# Patient Record
Sex: MALE | Race: WHITE | HISPANIC OR LATINO | URBAN - METROPOLITAN AREA
[De-identification: names, ages, dates, MRNs, and addresses within clinical notes are randomized per-mention and may not be internally consistent; named-entity substitution may affect disease eponyms.]

---
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
## 2020-11-11 ENCOUNTER — TRANSCRIPTION ENCOUNTER (OUTPATIENT)
Age: 20
End: 2020-11-11

## 2021-02-02 ENCOUNTER — EMERGENCY (EMERGENCY)
Facility: HOSPITAL | Age: 21
LOS: 0 days | Discharge: HOME | End: 2021-02-02
Attending: PEDIATRICS | Admitting: PEDIATRICS
Payer: MEDICAID

## 2021-02-02 VITALS
RESPIRATION RATE: 16 BRPM | OXYGEN SATURATION: 97 % | DIASTOLIC BLOOD PRESSURE: 92 MMHG | SYSTOLIC BLOOD PRESSURE: 142 MMHG | TEMPERATURE: 98 F | HEART RATE: 103 BPM

## 2021-02-02 DIAGNOSIS — S69.90XA UNSPECIFIED INJURY OF UNSPECIFIED WRIST, HAND AND FINGER(S), INITIAL ENCOUNTER: ICD-10-CM
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
## 2021-02-02 DIAGNOSIS — S62.304A UNSPECIFIED FRACTURE OF FOURTH METACARPAL BONE, RIGHT HAND, INITIAL ENCOUNTER FOR CLOSED FRACTURE: ICD-10-CM
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
## 2021-02-02 PROCEDURE — 73110 X-RAY EXAM OF WRIST: CPT | Mod: 26,RT

## 2021-02-02 PROCEDURE — 99284 EMERGENCY DEPT VISIT MOD MDM: CPT | Mod: 25

## 2021-02-02 PROCEDURE — 73130 X-RAY EXAM OF HAND: CPT | Mod: 26,RT

## 2021-02-02 PROCEDURE — 29125 APPL SHORT ARM SPLINT STATIC: CPT

## 2021-02-02 NOTE — ED PROVIDER NOTE - NSFOLLOWUPCLINICS_GEN_ALL_ED_FT
[facility]  Hand  [address], [address]  [place] [address]  Phone: [phone]  Fax:   Follow Up Time: 1-3 Days

## 2021-02-02 NOTE — ED PROVIDER NOTE - CARE PROVIDER_API CALL
Valeriano Zavaleta)  Orthopaedic Surgery  [address] [place] [address]  Phone: [phone]  Fax: [phone]  Follow Up Time: 1-3 Days

## 2021-02-02 NOTE — ED PROVIDER NOTE - OBJECTIVE STATEMENT
The patient is a 20 year old male with no PMH presenting for evaluation s/p hand injury. Pt states he was walking up the stairs outside and tripped and fell on right hand. Pt c/o moderate pain to right hand worse over the 3-5th digits. Pain worse with flexion/extension. No numbness, tingling.

## 2021-02-02 NOTE — ED PROVIDER NOTE - NSFOLLOWUPINSTRUCTIONS_ED_ALL_ED_FT
Boxer Fracture    WHAT YOU NEED TO KNOW:    A boxer fracture is a break of a bone in your hand. This type of fracture usually happens in the bone that connects your wrist to your little finger. It can also happen in bone that connects your wrist to your ring finger. A boxer fracture occurs when you hit an object with a closed fist. The bone may be out of place or in pieces. An open fracture is when there is a break in the skin.    DISCHARGE INSTRUCTIONS:    Medicines:     Medicines may be given to decrease pain. Ask your healthcare provider how to take prescription pain medicine safely. If you have an open wound, you may also be given antibiotics or a tetanus vaccine to prevent an infection.      Take your medicine as directed. Contact your healthcare provider if you think your medicine is not helping or if you have side effects. Tell him or her if you are allergic to any medicine. Keep a list of the medicines, vitamins, and herbs you take. Include the amounts, and when and why you take them. Bring the list or the pill bottles to follow-up visits. Carry your medicine list with you in case of an emergency.    Follow up with your healthcare provider or orthopedist as directed: You may need to return for more x-rays to check bone position. Write down your questions so you remember to ask them during your visits.     Apply ice: Apply ice on your injury for 15 to 20 minutes every hour or as directed. Use an ice pack, or put crushed ice in a plastic bag. Cover it with a towel. Ice helps prevent tissue damage and decreases swelling and pain.    Elevate your hand: Elevate your hand above the level of your heart as often as you can. This will help decrease swelling and pain. Prop your hand on pillows or blankets to keep it elevated comfortably.     Go to physical therapy: A physical therapist teaches you exercises to help improve movement and strength, and to decrease pain.    Wound care: Care for your wound as directed. Carefully wash the wound with soap and water. Dry the area and put on new, clean bandages as directed. Change your bandages when they get wet or dirty.    Contact your healthcare provider or orthopedist if:     You have a fever.      Your open wound is red, swollen, or draining pus.      You have trouble moving your finger.      You have questions or concerns about your condition or care.    Return to the emergency department if:     You cannot bend or extend your finger.      You have severe pain.      You have numbness or tingling in your finger.         © Copyright [employer] 2019 All illustrations and images included in CareNotes are the copyrighted property of [employer] or [employer].

## 2021-02-02 NOTE — ED PROVIDER NOTE - PHYSICAL EXAMINATION
CONSTITUTIONAL: Well-developed; well-nourished; in no acute distress.   SKIN: warm, dry  HEAD: Normocephalic; no skull indentations, no contusions or lacerations  EXT: no gross extremity injury +b/l radial pulses 2+, +ttp of right 3-5 digits worse with flexion/extension +ttp at base of 5th metacarpa.   NEURO: gcs 15, moving all extremities grossly, following commands  PSYCH: Cooperative, appropriate

## 2021-02-02 NOTE — ED PROVIDER NOTE - NS ED ROS FT
MS: No muscle weakness, +right hand pain.   Neuro: No headache or weakness. No LOC. no numbness/tingling.   Skin: No skin rash.

## 2021-02-02 NOTE — ED PROVIDER NOTE - PATIENT PORTAL LINK FT
You can access the FollowMyHealth Patient Portal offered by [facility] by registering at the following website: [email or web address]. By joining [email or web address]’s FollowMyHealth portal, you will also be able to view your health information using other applications (apps) compatible with our system.

## 2021-04-30 ENCOUNTER — OUTPATIENT (OUTPATIENT)
Dept: OUTPATIENT SERVICES | Facility: HOSPITAL | Age: 21
LOS: 1 days | Discharge: HOME | End: 2021-04-30

## 2021-10-27 ENCOUNTER — EMERGENCY (EMERGENCY)
Facility: HOSPITAL | Age: 21
LOS: 0 days | Discharge: HOME | End: 2021-10-27
Attending: EMERGENCY MEDICINE | Admitting: EMERGENCY MEDICINE
Payer: MEDICAID

## 2021-10-27 VITALS
DIASTOLIC BLOOD PRESSURE: 74 MMHG | WEIGHT: 152.12 LBS | TEMPERATURE: 98 F | HEART RATE: 88 BPM | OXYGEN SATURATION: 97 % | SYSTOLIC BLOOD PRESSURE: 121 MMHG | RESPIRATION RATE: 18 BRPM

## 2021-10-27 DIAGNOSIS — R51.9 HEADACHE, UNSPECIFIED: ICD-10-CM

## 2021-10-27 DIAGNOSIS — F17.200 NICOTINE DEPENDENCE, UNSPECIFIED, UNCOMPLICATED: ICD-10-CM

## 2021-10-27 DIAGNOSIS — M79.10 MYALGIA, UNSPECIFIED SITE: ICD-10-CM

## 2021-10-27 PROCEDURE — 99283 EMERGENCY DEPT VISIT LOW MDM: CPT

## 2021-10-27 RX ORDER — ACETAMINOPHEN 500 MG
975 TABLET ORAL ONCE
Refills: 0 | Status: COMPLETED | OUTPATIENT
Start: 2021-10-27 | End: 2021-10-27

## 2021-10-27 RX ADMIN — Medication 975 MILLIGRAM(S): at 10:53

## 2021-10-27 NOTE — ED PROVIDER NOTE - OBJECTIVE STATEMENT
20 y/o male no PMHx presents to the ED c/o "I got my 2nd covid vaccine 3 days ago and I have a left sided headache. I haven't tried anything for pain and it is mild today." no hx trauma/ n/v/dizziness/ weakness/ numbness

## 2021-10-27 NOTE — ED PROVIDER NOTE - WET READ LAUNCH FT
From: Lele Dunham  To: Karthikeyan Ferrera MD  Sent: 10/8/2019 11:25 AM CDT  Subject: Non-Urgent Medical Question    Good Morning Dr Ferrera,  Just wanted inform you, that I started taking my blood pressure when I get up in the morning on 10/4 it was 117/73 heart rate 61 and on 10/6 it was 118/74. I got up this morning around 5:53 it read 134/82 I wanted to check it again because I could feel it was still going up. The second time it registered at 175/152 Heart rate was 65. When [facility] opened I contacted them to see if they could test my blood pressure equipment which they did around 8:00.  When tested my equipment resistered 149/86 HR 71. When they retook it the measurement was off a copy of points. I know I have about a week to go but I wanted to inform you on what had occurred.    Lele COLLADO   There are no Wet Read(s) to document.

## 2021-10-27 NOTE — ED PROVIDER NOTE - PATIENT PORTAL LINK FT
You can access the FollowMyHealth Patient Portal offered by [facility] by registering at the following website: [email or web address]. By joining [employer]’s FollowMyHealth portal, you will also be able to view your health information using other applications (apps) compatible with our system.

## 2021-10-27 NOTE — ED PROVIDER NOTE - NSFOLLOWUPCLINICS_GEN_ALL_ED_FT
[facility]  Medicine  [address] [place], [place]   Phone: [phone]  Fax:

## 2021-10-27 NOTE — ED ADULT NURSE NOTE - CHIEF COMPLAINT QUOTE
Patient c/o headache states he got second dose of vaccine two days ago patient did not take any medications and states headache is starting to feel better

## 2021-10-27 NOTE — ED PROVIDER NOTE - CLINICAL SUMMARY MEDICAL DECISION MAKING FREE TEXT BOX
20yo M no significant past medical history presenting with L sided headache post covid vaccine, now improved. +myalgias. no other acute complaints. Well appearing, NAD, non toxic. NCAT PERRLA EOMI neck supple non tender normal wob neuro non focal. Comfortable with discharge and follow-up outpatient, strict return precautions given. Endorses understanding of all of this and aware that they can return at any time for new or concerning symptoms. No further questions or concerns at this time

## 2021-10-27 NOTE — ED ADULT TRIAGE NOTE - CHIEF COMPLAINT QUOTE
TRANSFER - OUT REPORT:    Verbal report given to [facility] GENERAL JOSEFINA HAYES RN(name) on Reshma Rich  being transferred to 627(unit) for routine progression of care       Report consisted of patients Situation, Background, Assessment and   Recommendations(SBAR). Information from the following report(s) SBAR, ED Summary, Procedure Summary, Intake/Output, MAR and Recent Results was reviewed with the receiving nurse. Lines:   Peripheral IV 07/05/21 Right Antecubital (Active)   Site Assessment Clean, dry, & intact 07/05/21 0343   Phlebitis Assessment 0 07/05/21 0343   Infiltration Assessment 0 07/05/21 0343   Dressing Status Clean, dry, & intact 07/05/21 0343   Dressing Type Transparent 07/05/21 0343   Hub Color/Line Status Pink 07/05/21 0343        Opportunity for questions and clarification was provided.       Patient transported with:  dionna Patient c/o headache states he got second dose of vaccine two days ago patient did not take any medications and states headache is starting to feel better

## 2021-10-27 NOTE — ED ADULT NURSE NOTE - OBJECTIVE STATEMENT
Patient is a 21 year old male complaining of a headache for the past 1 day. Did not take anything for it at home.

## 2021-11-21 ENCOUNTER — EMERGENCY (EMERGENCY)
Facility: HOSPITAL | Age: 21
LOS: 0 days | Discharge: HOME | End: 2021-11-22
Attending: EMERGENCY MEDICINE | Admitting: EMERGENCY MEDICINE
Payer: MEDICAID

## 2021-11-21 VITALS
OXYGEN SATURATION: 97 % | TEMPERATURE: 98 F | SYSTOLIC BLOOD PRESSURE: 131 MMHG | WEIGHT: 149.91 LBS | HEART RATE: 87 BPM | DIASTOLIC BLOOD PRESSURE: 95 MMHG | RESPIRATION RATE: 19 BRPM

## 2021-11-21 DIAGNOSIS — F39 UNSPECIFIED MOOD [AFFECTIVE] DISORDER: ICD-10-CM

## 2021-11-21 DIAGNOSIS — F32.A DEPRESSION, UNSPECIFIED: ICD-10-CM

## 2021-11-21 DIAGNOSIS — F12.20 CANNABIS DEPENDENCE, UNCOMPLICATED: ICD-10-CM

## 2021-11-21 DIAGNOSIS — F41.8 OTHER SPECIFIED ANXIETY DISORDERS: ICD-10-CM

## 2021-11-21 DIAGNOSIS — F41.9 ANXIETY DISORDER, UNSPECIFIED: ICD-10-CM

## 2021-11-21 LAB
ALBUMIN SERPL ELPH-MCNC: 5.1 G/DL — SIGNIFICANT CHANGE UP (ref 3.5–5.2)
ALP SERPL-CCNC: 74 U/L — SIGNIFICANT CHANGE UP (ref 30–115)
ALT FLD-CCNC: 10 U/L — SIGNIFICANT CHANGE UP (ref 0–41)
ANION GAP SERPL CALC-SCNC: 13 MMOL/L — SIGNIFICANT CHANGE UP (ref 7–14)
APAP SERPL-MCNC: <5 UG/ML — LOW (ref 10–30)
AST SERPL-CCNC: 19 U/L — SIGNIFICANT CHANGE UP (ref 0–41)
BASOPHILS # BLD AUTO: 0.1 K/UL — SIGNIFICANT CHANGE UP (ref 0–0.2)
BASOPHILS NFR BLD AUTO: 1.6 % — HIGH (ref 0–1)
BILIRUB SERPL-MCNC: 0.5 MG/DL — SIGNIFICANT CHANGE UP (ref 0.2–1.2)
BUN SERPL-MCNC: 10 MG/DL — SIGNIFICANT CHANGE UP (ref 10–20)
CALCIUM SERPL-MCNC: 9.6 MG/DL — SIGNIFICANT CHANGE UP (ref 8.5–10.1)
CHLORIDE SERPL-SCNC: 101 MMOL/L — SIGNIFICANT CHANGE UP (ref 98–110)
CO2 SERPL-SCNC: 27 MMOL/L — SIGNIFICANT CHANGE UP (ref 17–32)
CREAT SERPL-MCNC: 0.9 MG/DL — SIGNIFICANT CHANGE UP (ref 0.7–1.5)
EOSINOPHIL # BLD AUTO: 0.06 K/UL — SIGNIFICANT CHANGE UP (ref 0–0.7)
EOSINOPHIL NFR BLD AUTO: 1 % — SIGNIFICANT CHANGE UP (ref 0–8)
ETHANOL SERPL-MCNC: <10 MG/DL — SIGNIFICANT CHANGE UP
GLUCOSE SERPL-MCNC: 85 MG/DL — SIGNIFICANT CHANGE UP (ref 70–99)
HCT VFR BLD CALC: 47 % — SIGNIFICANT CHANGE UP (ref 42–52)
HGB BLD-MCNC: 15.9 G/DL — SIGNIFICANT CHANGE UP (ref 14–18)
IMM GRANULOCYTES NFR BLD AUTO: 0.3 % — SIGNIFICANT CHANGE UP (ref 0.1–0.3)
LYMPHOCYTES # BLD AUTO: 1.58 K/UL — SIGNIFICANT CHANGE UP (ref 1.2–3.4)
LYMPHOCYTES # BLD AUTO: 25.8 % — SIGNIFICANT CHANGE UP (ref 20.5–51.1)
MCHC RBC-ENTMCNC: 29.6 PG — SIGNIFICANT CHANGE UP (ref 27–31)
MCHC RBC-ENTMCNC: 33.8 G/DL — SIGNIFICANT CHANGE UP (ref 32–37)
MCV RBC AUTO: 87.5 FL — SIGNIFICANT CHANGE UP (ref 80–94)
MONOCYTES # BLD AUTO: 0.59 K/UL — SIGNIFICANT CHANGE UP (ref 0.1–0.6)
MONOCYTES NFR BLD AUTO: 9.6 % — HIGH (ref 1.7–9.3)
NEUTROPHILS # BLD AUTO: 3.77 K/UL — SIGNIFICANT CHANGE UP (ref 1.4–6.5)
NEUTROPHILS NFR BLD AUTO: 61.7 % — SIGNIFICANT CHANGE UP (ref 42.2–75.2)
NRBC # BLD: 0 /100 WBCS — SIGNIFICANT CHANGE UP (ref 0–0)
PLATELET # BLD AUTO: 331 K/UL — SIGNIFICANT CHANGE UP (ref 130–400)
POTASSIUM SERPL-MCNC: 4.3 MMOL/L — SIGNIFICANT CHANGE UP (ref 3.5–5)
POTASSIUM SERPL-SCNC: 4.3 MMOL/L — SIGNIFICANT CHANGE UP (ref 3.5–5)
PROT SERPL-MCNC: 7.7 G/DL — SIGNIFICANT CHANGE UP (ref 6–8)
RBC # BLD: 5.37 M/UL — SIGNIFICANT CHANGE UP (ref 4.7–6.1)
RBC # FLD: 12.4 % — SIGNIFICANT CHANGE UP (ref 11.5–14.5)
SALICYLATES SERPL-MCNC: <0.3 MG/DL — LOW (ref 4–30)
SODIUM SERPL-SCNC: 141 MMOL/L — SIGNIFICANT CHANGE UP (ref 135–146)
WBC # BLD: 6.12 K/UL — SIGNIFICANT CHANGE UP (ref 4.8–10.8)
WBC # FLD AUTO: 6.12 K/UL — SIGNIFICANT CHANGE UP (ref 4.8–10.8)

## 2021-11-21 PROCEDURE — 90792 PSYCH DIAG EVAL W/MED SRVCS: CPT | Mod: 95

## 2021-11-21 PROCEDURE — 99284 EMERGENCY DEPT VISIT MOD MDM: CPT

## 2021-11-21 RX ORDER — OLANZAPINE 15 MG/1
5 TABLET, FILM COATED ORAL ONCE
Refills: 0 | Status: COMPLETED | OUTPATIENT
Start: 2021-11-21 | End: 2021-11-21

## 2021-11-21 RX ADMIN — OLANZAPINE 5 MILLIGRAM(S): 15 TABLET, FILM COATED ORAL at 23:25

## 2021-11-21 NOTE — ED BEHAVIORAL HEALTH ASSESSMENT NOTE - REFERRAL / APPOINTMENT DETAILS
Email sent on patient's behalf for [facility] outpatient psychiatric referral. [facility] crisis clinic information also faxed.

## 2021-11-21 NOTE — ED BEHAVIORAL HEALTH ASSESSMENT NOTE - OTHER
Records Checked-No Data: Bode Inpatient, Bode CL, Alpha ED, Alpha Inpatient, Alpha CL, HIE Outpatient , CVM Inpatient, CVM Outpatient, Tier Inpatient, Tier E&A, Meditech Inpatient, Meditech ED, Quick Docs, One Content Inpatient, One Content CL, [place], Social Media (For example - Facebook, [employer], [employer]), Web search, Forensic Databases

## 2021-11-21 NOTE — ED BEHAVIORAL HEALTH ASSESSMENT NOTE - NSBHPSYCHOLCOGORIENT_PSY_A_CORE
You can access the FollowMyHealth Patient Portal offered by [facility] by registering at the following website: [email or web address]. By joining [employer]’s FollowMyHealth portal, you will also be able to view your health information using other applications (apps) compatible with our system.
Oriented to time, place, person, situation

## 2021-11-21 NOTE — ED PROVIDER NOTE - CLINICAL SUMMARY MEDICAL DECISION MAKING FREE TEXT BOX
Pt medically cleared for tele psych eval.  They rec to start Zyprexa 5 mg qhs x 2 weeks.  They will e mail outpt department to try and expedite appt for pt.  Pt given dose of meds here.  Tolerated well and want sto go home.  Pt will have low threshold to return to ED. Pt instructed to return if any worsening symptoms or concerns.  They verbalize understanding.

## 2021-11-21 NOTE — ED PROVIDER NOTE - OBJECTIVE STATEMENT
22 y/o male presents to the Ed with feeling overwhelmed. patient c/o increased anxiety and mind racing over past few weeks. patient unable to sleep at night. patient denies any IVDA. patient denies any SI/HI. patient states he feels isolated at home and has no one to talk to. patient denies any fevers, chest pain, back pain. no prior psych hx ,not taking any meds, no family hx psych disorder

## 2021-11-21 NOTE — ED BEHAVIORAL HEALTH ASSESSMENT NOTE - RISK ASSESSMENT
Prominent risk and protective factors are noted in documentation above notably with some non-modifiable risk factors with future oriented thinking and insight into need to manage or address the modifiable risk factors. Low Acute Suicide Risk

## 2021-11-21 NOTE — ED PROVIDER NOTE - PATIENT PORTAL LINK FT
You can access the FollowMyHealth Patient Portal offered by [facility] by registering at the following website: [email or web address]. By joining [employer]’s FollowMyHealth portal, you will also be able to view your health information using other applications (apps) compatible with our system.

## 2021-11-21 NOTE — ED PROVIDER NOTE - NSFOLLOWUPCLINICS_GEN_ALL_ED_FT
[facility]  OP Mental Health  [address] [place] [id number]  Phone: [phone]  Fax:

## 2021-11-21 NOTE — ED PROVIDER NOTE - NS ED ROS FT
Review of Systems    Constitutional: (-) fever/ chills (-)loss of appetite or  weight loss  Eyes (-) visual changes  ENT: (-) epistaxis (-) sore throat (-) ear pain  Cardiovascular: (-) chest pain, (-) syncope (-) palpitations  Respiratory: (-) cough, (-) shortness of breath  Gastrointestinal: (-) vomiting, (-) diarrhea (-) abdominal pain  neck: (-) neck pain or stiffness  Musculoskeletal:  (-) back pain, (-) joint pain     Neurological: (-) headache, (-) altered mental status  Psychiatric: (-) hallucinations + depression

## 2021-11-21 NOTE — ED BEHAVIORAL HEALTH NOTE - BEHAVIORAL HEALTH NOTE
PRE-HOSPITAL COURSE  ===================  SOURCE:  Second-hand information via EMR documentation and primary RN.  DETAILS: Patient self-presented to the ED no additional pre-hospital course available    ===================  ED COURSE:   SOURCE:  Second-hand information via EMR documentation and primary RN   ARRIVAL:  Patient self-presented to the ED with no noted incidents.  BELONGINGS:  Clothing; nothing of note in belongings.   BEHAVIOR: Complied with triage protocols –provided blood/urine, changed into a hospital gown, allowed staff to wand/collect belongings without incident. Patient denies SI and denies HI. Patient presented to the ED with complaints of increased depression and anxiety. He has been calm and cooperative in the ED. No aggression or behavioral issues reported.   TREATMENT: No prn medications, restraints, security interventions or manual holds required.   VISITORS: Patient is unaccompanied by family or social supports.     Patient did not consent to collateral       COVID Exposure Screen- collateral (i.e. third-party, chart review, belongings, etc; include EMS and ED staff)  1.	*Has the patient had a COVID-19 test in the last 90 days?  (  ) Yes   (  X) No   (  ) Unknown- Reason: _____  2.	*Has the patient tested positive for COVID-19 antibodies? (  ) Yes   (  ) No   (X  ) Unknown- Reason: _____  3.	*Has the patient received 2 doses of the COVID-19 vaccine? (  ) Yes   ( X ) No   (  ) Unknown- Reason: _____  IF YES PROCEED TO QUESTION #6. IF NO or UNKNOWN, PLEASE SKIP TO QUESTION #7.  4.	 Date of second dose: __10/21/21______  5.	*In the past 10 days, has the patient been around anyone with a positive COVID-19 test?* (  ) Yes   ( X ) No   (  ) Unknown-   6.	*Has the patient been out of [place] within the past 10 days?* (  ) Yes   (X  ) No   (  ) Unknown- Reason: _____

## 2021-11-21 NOTE — ED BEHAVIORAL HEALTH ASSESSMENT NOTE - HPI (INCLUDE ILLNESS QUALITY, SEVERITY, DURATION, TIMING, CONTEXT, MODIFYING FACTORS, ASSOCIATED SIGNS AND SYMPTOMS)
On assessment, patient relays "today I felt really unstable" elaborating that he's had "a mix of emotions" and has "anger issues." He relays "lately I've been more and more angry" and also has been struggling with "anxiety and depression." He reports these issues longstanding "but lately I can't control it." He reports a feeling of apathy most days and insomnia. He describes delayed sleep onset (til 5am) and interrupted sleep, estimating 4-5 hours of sleep daily. He relays "I can't fall asleep if I wake up." He reports robust amounts of energy but has been more isolated, anhedonic and with appetite impairment. He denies any changes in his concentration. He denies any death wishes, SI or HI. He relays feeling like his "eyesight plays games with me" elaborating that this occurs randomly especially when he hasn't been sleeping. He reports seeing "dark figures" randomly but also reports this may be due to having his glasses on. He denies any AH, paranoid, ideas of reference, thought broadcasting or thought insertion initially. Later, however, he tells me that he "didn't want to sound crazy" but does intermittently experience vague AH, particularly if he hasn't been able to sleep for some time where he starts hearing "music or people" but can not typically recall what they say. He estimates last experiencing this about a week or 2 ago. He reports frequent worries particularly related to "things I want to do." He reports limited symptoms of hypomania, some decreased need for sleep, irritability, hyper-productivity and racing thoughts. He denies any impulsivity stating "I always think before I do anything stupid." He does not evidence any grandiosity or overt worthlessness but does convey sadness about is recent mental state and feeling his mood changes too frequently. He reports having "a lot of personal stuff that I wouldn't like to talk about" alluding to work goals, trying to move out of the house and get his own car. Patient reports trauma history but doesn't want to talk about it. He reports that he used to get nightmares but not anymore. He reports rare panic attacks but can only recall one incident ~a year ago. He reports daily cannabis use to facilitate sleep and notes this also prevents him from having dreams. He denies any nicotine, alcohol or illicit substance use but admits to remote experimentation with "acid" (last year).     COVID Exposure Screen- Patient  Have you had a COVID-19 test in the last 90 days? No  Have you tested positive for COVID-19 antibodies? No  Have you received 2 doses of the COVID-19 vaccine? Yes, received 2nd vaccine dose ~a month ago  In the past 10 days, have you been around anyone with a positive COVID-19 test? No  Have you been out of [place] within the past 10 days? No    Patient did not give permission to obtain collateral. There is inadequate rationale for overriding his objection at this time (no evident safety threats; self presented to speak with a psychiatrist on his own accord for anxiety). This is a 21 year old single, employed male, non-caregiver, domiciled with parents and siblings, previously in anger management therapy remotely, with no formal past psychiatric illness history, no history of suicide attempts or non-suicidal self injury, no violence, aggression or legal issues, reported cannabis use without other substance use, past unspecified trauma history and no pertinent past medical history who self presents to the ED endorsing feelings of depression and insomnia with anxious expressions on ED provider assessment. Psychiatry consulted for evaluation.     On assessment, patient relays "today I felt really unstable" elaborating that he's had "a mix of emotions" and has "anger issues" but "lately I've been more and more angry." He relays he has also been struggling with "anxiety and depression" reporting these issues as longstanding "but lately I can't control it." He describes his anger as a feeling of being "heated" and feeling a desperation to relax himself. He denies getting into any physical or verbal altercations as a result of his anger and spontaneously relays use of a punching bag as a primary coping mechanism for dispelling angry feelings. He reports a feeling of apathy or irritability most days and insomnia and conveys frequent mood swings. He relays feeling more down or depressed in the last week and a half. He describes delayed sleep onset most days (til 5am) and interrupted sleep, estimating 4-5 hours of sleep daily. He relays "I can't fall asleep if I wake up." He reports robust amounts of energy but has been more isolated, anhedonic and with appetite impairment estimating eating one meal a day in the last week. He denies any changes in his concentration.     Patient denies any death wishes, SI or HI. He relays feeling like his "eyesight plays games with me" elaborating that this occurs randomly especially when he hasn't been sleeping describing that he sometimes sees "dark figures" randomly but also reports this may be due to not having his glasses on. He denies paranoia, ideas of reference, thought broadcasting or thought insertion. He initially denies any AH but later tells me that he "didn't want to sound crazy" but does intermittently experience vague AH, particularly if he hasn't been able to sleep for some time where he starts hearing "music or people" but can not typically recall what they say. He denies any command hallucinations. He estimates last experiencing this about a week or 2 ago. He reports frequent worries particularly related to "things I want to do" spontaneously conveying stressors of work goals, trying to move out of the house and wanting to get his own car. He reports limited symptoms of hypomania, some decreased need for sleep, irritability, hyper-productivity and racing thoughts. He denies any impulsivity stating "I always think before I do anything stupid." He does not evidence any grandiosity or overt worthlessness but does reiterate feeling sadness about his recent mental state and feeling his mood changes too frequently.     Patient reports trauma history but doesn't want to talk about it. He reports that he used to get nightmares but not anymore. He reports rare panic attacks but can only recall one incident ~a year ago. He reports daily cannabis use to facilitate sleep and notes this also prevents him from having dreams. He denies any nicotine, alcohol or illicit substance use but admits to remote experimentation with "acid" (last year). He partakes in treatment planning appropriately noting that he is not inclined to stay in the hospital overnight for treatment but would do so in the future if symptoms worsened or safety issues arose. He conveys interest in establishing with an outpatient psychiatrist and is also interested in medication management. He is receptive to education on the diagnostic possibilities contributing to his symptoms and medication management strategies and accepts recommendation for Olanzapine (after r/b/a discussion). He reports having access to a primary care provider who he is able to contact to facilitate care and also consents to having an email sent to [facility] psychiatric clinic to reach out to him for an intake appointment.     COVID Exposure Screen- Patient  Have you had a COVID-19 test in the last 90 days? No  Have you tested positive for COVID-19 antibodies? No  Have you received 2 doses of the COVID-19 vaccine? Yes, received 2nd vaccine dose ~a month ago  In the past 10 days, have you been around anyone with a positive COVID-19 test? No  Have you been out of [place] within the past 10 days? No    Patient did not give permission to obtain collateral. He conveys wanting to maintain an element of his privacy and not wanting to concern his family with having presented to the emergency room for psychiatric complaints. Rationale for overriding his objection at this time is insufficient as there are no evident safety threats and patient self presented to speak with a psychiatrist on his own accord.

## 2021-11-21 NOTE — ED BEHAVIORAL HEALTH ASSESSMENT NOTE - MEDICATIONS (PRESCRIPTIONS, DIRECTIONS)
Offer Zyprexa 5 mg PO in the ED to assess tolerance. If no expressed adverse effect; can send patient home with Zyprexa 5 mg HS (x2 weeks).

## 2021-11-21 NOTE — ED BEHAVIORAL HEALTH ASSESSMENT NOTE - DIFFERENTIAL
Unspecified mood and psychotic disorders  r/o Major Depressive disorder w/ mixed features w/ psychotic features  vs Mixed episode Bipolar illness w/ psychotic features  vs Schizophreniform disorder (w/ depressive prodrome)  vs Cannabis induced psychosis

## 2021-11-21 NOTE — ED BEHAVIORAL HEALTH ASSESSMENT NOTE - DETAILS
alludes to trauma but doesn't want to talk about it. Asthma (sister). No known family psychiatric illness, suicides or substance use issues. self referred as above Verbal safety planning discussed at length. Patient to return to ED if hallucinations, other symptoms of psychosis, suicidality/homicidality, subjective worsening of symptoms or intolerable side effects to medications

## 2021-11-21 NOTE — ED PROVIDER NOTE - ATTENDING CONTRIBUTION TO CARE
22 yo M presents with feeling overwhelmed, feels increasing anxiety over the past few weeks. Having trouble sleeping at night, feeling isolated.  Not suicidal, no AV hallucinations.  Pt wants to speak to psychiatrist,  On exam pt in NAD AAO x 3, no signs of trauma, well groomed, cooperative, Lungs cta b/l, abd soft nt nd, steady gait

## 2021-11-21 NOTE — ED ADULT NURSE NOTE - DISCHARGE DATE/TIME
Your cholesterol panel is slight improved, may need to increase your atorvastatin if stays elevated, you are not anemic, your urine and kidney and liver and thyroid and diabetes and vitamin d level are controlled 22-Nov-2021 00:07

## 2021-11-21 NOTE — ED BEHAVIORAL HEALTH ASSESSMENT NOTE - NSSUICRSKFACTOR_PSY_ALL_CORE
Current and Past Psychiatric Diagnoses/Presenting Symptoms/Historical Factors/Treatment Related Factors/Activating Events/Stressors/Other

## 2021-11-21 NOTE — ED BEHAVIORAL HEALTH ASSESSMENT NOTE - SUMMARY
This is a 21 year old single, employed male, non-caregiver, domiciled with parents and siblings, previously in anger management therapy remotely, with no formal past psychiatric illness history, no history of suicide attempts or non-suicidal self injury, no violence, aggression or legal issues, reported cannabis use without other substance use, past unspecified trauma history and no pertinent past medical history who self presents to the ED endorsing feelings of depression and insomnia with anxious expressions on ED provider assessment. On psychiatric evaluation, he is appropriately engaged, not acutely manic, psychotic, suicidal or homicidal and does not meet criteria for involuntary hospitalization. He conveys a myriad of mood and anxiety spectrum symptoms with vague auditory hallucinations when insomnia is exacerbated with increased feelings of depression in the last week and half. Differential is broad particularly given patient's age and cannabis use but clinical picture would be most consistent with prodrome preceding Schizophrenia. He is not interested in voluntary psychiatric admission but does engage well towards outpatient treatment planning. After r/b/a discussion, recommend initiating atypical neuroleptic in the ED to minimize risk of deteriorating mood and psychosis as patient awaits outpatient care.

## 2021-11-22 VITALS
SYSTOLIC BLOOD PRESSURE: 126 MMHG | DIASTOLIC BLOOD PRESSURE: 77 MMHG | RESPIRATION RATE: 18 BRPM | OXYGEN SATURATION: 98 % | HEART RATE: 81 BPM

## 2021-11-22 PROBLEM — Z78.9 OTHER SPECIFIED HEALTH STATUS: Chronic | Status: ACTIVE | Noted: 2021-10-27

## 2021-11-22 RX ORDER — OLANZAPINE 15 MG/1
1 TABLET, FILM COATED ORAL
Qty: 14 | Refills: 0
Start: 2021-11-22 | End: 2021-12-05

## 2022-06-02 ENCOUNTER — EMERGENCY (EMERGENCY)
Facility: HOSPITAL | Age: 22
LOS: 0 days | Discharge: HOME | End: 2022-06-02
Attending: STUDENT IN AN ORGANIZED HEALTH CARE EDUCATION/TRAINING PROGRAM | Admitting: STUDENT IN AN ORGANIZED HEALTH CARE EDUCATION/TRAINING PROGRAM
Payer: MEDICAID

## 2022-06-02 VITALS
SYSTOLIC BLOOD PRESSURE: 112 MMHG | OXYGEN SATURATION: 99 % | HEART RATE: 74 BPM | DIASTOLIC BLOOD PRESSURE: 68 MMHG | RESPIRATION RATE: 15 BRPM | TEMPERATURE: 98 F

## 2022-06-02 VITALS
OXYGEN SATURATION: 98 % | DIASTOLIC BLOOD PRESSURE: 74 MMHG | SYSTOLIC BLOOD PRESSURE: 119 MMHG | RESPIRATION RATE: 16 BRPM | TEMPERATURE: 98 F | WEIGHT: 138.01 LBS | HEART RATE: 91 BPM

## 2022-06-02 DIAGNOSIS — F19.94 OTHER PSYCHOACTIVE SUBSTANCE USE, UNSPECIFIED WITH PSYCHOACTIVE SUBSTANCE-INDUCED MOOD DISORDER: ICD-10-CM

## 2022-06-02 DIAGNOSIS — F12.90 CANNABIS USE, UNSPECIFIED, UNCOMPLICATED: ICD-10-CM

## 2022-06-02 PROCEDURE — 99284 EMERGENCY DEPT VISIT MOD MDM: CPT

## 2022-06-02 RX ORDER — HYDROXYZINE HCL 10 MG
1 TABLET ORAL
Qty: 7 | Refills: 0
Start: 2022-06-02 | End: 2022-06-08

## 2022-06-02 RX ORDER — HYDROXYZINE HCL 10 MG
50 TABLET ORAL ONCE
Refills: 0 | Status: COMPLETED | OUTPATIENT
Start: 2022-06-02 | End: 2022-06-02

## 2022-06-02 RX ADMIN — Medication 50 MILLIGRAM(S): at 22:24

## 2022-06-02 NOTE — ED BEHAVIORAL HEALTH ASSESSMENT NOTE - DESCRIPTION
None known Patient calm and cooperative in ED. Patient works at [employer], lives with family

## 2022-06-02 NOTE — ED PROVIDER NOTE - NS ED ROS FT
CONST: No fever, chills or bodyaches  EYES: No pain, redness, drainage or visual changes.  ENT: No ear pain or discharge, nasal discharge or congestion. No sore throat  CARD: No chest pain, palpitations  RESP: No SOB, cough, hemoptysis. No hx of asthma or COPD  GI: No abdominal pain, N/V/D  : No urinary symptoms  MS: No joint pain, back pain or extremity pain/injury  SKIN: No rashes  NEURO: No headache, dizziness, paresthesias or LOC  PSYCH: (+) anger issues

## 2022-06-02 NOTE — ED PROVIDER NOTE - NSFOLLOWUPINSTRUCTIONS_ED_ALL_ED_FT
Mood Disorders    WHAT YOU NEED TO KNOW:    A mood disorder, or affective disorder, is a condition that causes your mood or emotions to be out of control. Your mood can affect your personality and how you act. It can also affect how you feel about yourself and life in general.    DISCHARGE INSTRUCTIONS:    Medicines:     Medicines can help control your moods.      Take your medicine as directed. Contact your healthcare provider if you think your medicine is not helping or if you have side effects. Tell him of her if you are allergic to any medicine. Keep a list of the medicines, vitamins, and herbs you take. Include the amounts, and when and why you take them. Bring the list or the pill bottles to follow-up visits. Carry your medicine list with you in case of an emergency.    Follow up with your healthcare provider as directed: You may need to return for regular visits or blood tests. Write down your questions so you remember to ask them during your visits.     Self-care:     Try to get 6 to 8 hours of sleep each night. Contact your healthcare provider if you have trouble sleeping.       Manage your stress. Learn new ways to relax, such as deep breathing or meditation.      Talk to someone about how you feel. Join a support group. Talk to your healthcare provider, family, or friends about your feelings. Tell them about things that upset you.       Exercise regularly. Ask about the best exercise plan for you. Most healthcare providers recommend 30 minutes each day, 5 days a week. Exercise helps to lower stress and manage your moods.    Contact your healthcare provider if:     You are depressed.      You feel anxious or worried.      You begin to drink alcohol, or you drink more than usual.      You take illegal drugs.      You take medicines that are not prescribed to you.      Your medicine causes you to feel drowsy, keeps you awake, or affects how much you eat.      You have questions or concerns about your condition or care.    Return to the emergency department if:     You have severe depression.      You want to hurt yourself or others.         © Copyright [employer] 2019 All illustrations and images included in CareNotes are the copyrighted property of [employer].D.A[employer] Inc[employer] or [employer].

## 2022-06-02 NOTE — ED PROVIDER NOTE - NS ED ATTENDING STATEMENT MOD
This was a shared visit with the FREDDIE. I reviewed and verified the documentation and independently performed the documented:

## 2022-06-02 NOTE — ED ADULT NURSE NOTE - HPI (INCLUDE ILLNESS QUALITY, SEVERITY, DURATION, TIMING, CONTEXT, MODIFYING FACTORS, ASSOCIATED SIGNS AND SYMPTOMS)
Pt. presents to ED requesting to be evaluated by psychiatry. Reports mood swings, mood outbursts, depression & decreased PO intake. States his mood will vary from happy to sad/depressed, to anger where he starts to hit objects. Denies getting physical with others and denies SI; reports SI thoughts in the past but currently denies SI. Also reports significant decrease in appetite with unintentional weight loss. States family is not supportive of mental health support & pt. requesting resources. Denies drug use other then marijuana; occasional etoh use.

## 2022-06-02 NOTE — ED ADULT TRIAGE NOTE - CHIEF COMPLAINT QUOTE
"I have anger issues I get really mad at my family a lot, I cant eat sometimes even though I know Im hungry, I have depression sometimes I cant leave my bed." Pt reports he wants to speak to a psychiatrist. Pt reports he has had thoughts of hurting himself in the past but not currently. Denies HI.

## 2022-06-02 NOTE — ED PROVIDER NOTE - PROGRESS NOTE DETAILS
FF: psych consulted, see consult note.   Plan: Hydroxyzine 50 mg PRN anxiety for 7 days  2. Referral to [facility] sent, patient provided with number as well [phone]  3. Safety plan complete  4. Advised to look up [facility] on the internet while awaiting OPD appointment  intermittent explosive disorder, JEFF, social anxiety disorder

## 2022-06-02 NOTE — ED BEHAVIORAL HEALTH ASSESSMENT NOTE - SUMMARY
Patient is a 21 year old single, employed male, non-caregiver, domiciled with parents and siblings, previously in anger management therapy remotely, with no formal past psychiatric illness history, no history of suicide attempts or intentional non-suicidal self injury, no violence or legal issues, reported cannabis use without other substance use, past unspecified trauma history and no pertinent past medical history who self presents to the ED wanting help managing his anger. He presented to ED in November 2021 with similar complaints. On approach, patient is alert, oriented, and cooperative with interview.    Patient's current presentation is consistent with substance induced mood disorder. Patient currently presents as linear, contemplative, and under good behavioral control. Though he is losing weight, he is getting adequate sleep and has good focus at work and has not faced any adverse effects. He is future oriented and has no safety concerns with outpatient followup. Patient counseled that daily cannabis use complicates diagnosis and advised to reduce/cease use.     Patient does not demonstrate manic or psychotic symptoms, is not acutely suicidal, and does not meet criteria for inpatient treatment.    PLAN  1. Hydroxyzine 50 mg PRN anxiety for 7 days  2. Referral to [facility] sent, patient provided with number as well [phone]  3. Safety plan complete  4. Advised to look up [facility] on the internet while awaiting OPD appointment

## 2022-06-02 NOTE — ED BEHAVIORAL HEALTH ASSESSMENT NOTE - RISK ASSESSMENT
Low Acute Suicide Risk Risk factors: impulsivity, gender, passive SI  Protective factors: insight into need for treatment, employment/housing/relationship security, no access to lethal means, coping mechanism, no prior suicidal attempts

## 2022-06-02 NOTE — ED PROVIDER NOTE - NSFOLLOWUPCLINICS_GEN_ALL_ED_FT
[facility]  OB/GYN  [address] [place] [address]  Phone: [phone]  Fax:   Follow Up Time: 4-6 Days

## 2022-06-02 NOTE — ED PROVIDER NOTE - PHYSICAL EXAMINATION
Physical Exam    Vital Signs: I have reviewed the initial vital signs.  Constitutional: well-nourished, appears stated age, no acute distress  Eyes: Conjunctiva pink, Sclera clear  Cardiovascular: S1 and S2, regular rate, regular rhythm, well-perfused extremities, radial pulses equal and 2+ b/l.   Respiratory: unlabored respiratory effort, clear to auscultation bilaterally no wheezing, rales and rhonchi. pt is speaking full sentences. no accessory muscle use.   Gastrointestinal: soft, non-tender, nondistended abdomen, no pulsatile mass, normal bowl sounds, no rebound, no guarding  Musculoskeletal: FROM of b/l upper and lower extremities   Integumentary: warm, dry, no rash  Neurologic: awake, alert, cranial nerves II-XII grossly intact, extremities’ motor and sensory functions grossly intact. steady gait.   Psychiatric: appropriate mood, appropriate affect. pt is kind, calm, and cooperative

## 2022-06-02 NOTE — ED PROVIDER NOTE - ATTENDING APP SHARED VISIT CONTRIBUTION OF CARE
22 y/o male with no significant PMH presents to the ED for evaluation of having a hard time dealing with his anger management and passive suicidal thoughts. denies alcohol use- occasional marijuana use. denies having a plan. denies medical complaints.    CONSTITUTIONAL: Well-developed; well-nourished; in no acute distress.   SKIN: warm, dry  HEAD: Normocephalic; atraumatic.  EYES: PERRL, EOMI, no conjunctival erythema  ENT: No nasal discharge; airway clear.  NECK: Supple; non tender.  CARD: S1, S2 normal; no murmurs, gallops, or rubs. Regular rate and rhythm.   RESP: No wheezes, rales or rhonchi.  ABD: soft ntnd  EXT: Normal ROM.  No clubbing, cyanosis or edema.   NEURO: Alert, oriented, grossly unremarkable  PSYCH: Cooperative, appropriate.    plan for psych eval.

## 2022-06-02 NOTE — ED BEHAVIORAL HEALTH ASSESSMENT NOTE - DETAILS
In chart Patient reports passive SI, denies plan or intent or history of either. Patient gets angry and punches walls during conflict with girlfriend and family, has not injured anyone or had encounters with legal system Patient does not want to discuss at this time Plan discussed with patient, questions answered

## 2022-06-02 NOTE — ED PROVIDER NOTE - CLINICAL SUMMARY MEDICAL DECISION MAKING FREE TEXT BOX
20 y/o male with no significant PMH presents to the ED for evaluation of having a hard time dealing with his anger management and passive suicidal thoughts. psych consult appreciated. discharged with hydroxyzine PRN, outpatient management. return precautions discussed in detail.

## 2022-06-02 NOTE — ED BEHAVIORAL HEALTH ASSESSMENT NOTE - ADDITIONAL DETAILS ALL
Patient has injured his hand when punching objects, however it's not clear that this is done with the goal of injuring himself.

## 2022-06-02 NOTE — ED BEHAVIORAL HEALTH ASSESSMENT NOTE - HPI (INCLUDE ILLNESS QUALITY, SEVERITY, DURATION, TIMING, CONTEXT, MODIFYING FACTORS, ASSOCIATED SIGNS AND SYMPTOMS)
Patient is a 21 year old single, employed male, non-caregiver, domiciled with parents and siblings, previously in anger management therapy remotely, with no formal past psychiatric illness history, no history of suicide attempts or non-suicidal self injury, no violence, aggression or legal issues, reported cannabis use without other substance use, past unspecified trauma history and no pertinent past medical history who self presents to the ED wanting help managing his anger. He presented to ED in November 2021 with similar complaints. On approach, patient is alert, oriented, and cooperative with interview.    Patient reports that he has been trying to get outpatient care to address his anger management issues but that he hasn't been able to. When he gets angry he punches things like walls and other objects, and has injured his hand in the process. He reports feelings of anxiety, especially in social settings which has made him isolative and not want to be around people other than his girlfriend. He also reports symptoms of depression like losing weight, and feeling down. These feelings come and go and don't occur for most of the week. He denies sustained feelings of hopelessness or trouble focusing at work--he reports that his focus on work helps him feel better. He gets 8 hours of sleep a night now that he's working, in the past would go several days without sleep during which time he would feel very fatigued and sometimes have auditory hallucinations. He denies current hallucinations. He does not drink alcohol or use drugs with the exception of cannabis which he smokes every night--he reports that this is a decrease from his prior use. Patient acknowledges that smoking is a "double edged sword" and that it's not a reliable way to deal with his feelings. He denies a history of self harm but does report intermittent thoughts about not wanting to be alive, reports that he gets tattoos whenever he gets an impulse to hurt himself. Denies any plan or intent to kill himself, reports his girlfriend as a reason to live as she has bipolar disorder and he loves her very much. He reports that he has tried to set up an appointment with outpatient psychiatry before but has not been able to. Denies access to firearms.    Per girlfriend, patient is a sensitive person with an "explosive" temper who has never hurt anyone, but has destroyed property. He has been losing weight and a couple of weeks ago told her that he wants to kill himself when his parents are out of town, however he had no plan or intent to do so. She emphasizes that he does not feel this way now.

## 2022-06-02 NOTE — ED BEHAVIORAL HEALTH ASSESSMENT NOTE - OTHER
Patient is appropriately reflective about need for treatment of anger issues, has good insight into triggers

## 2022-06-02 NOTE — ED PROVIDER NOTE - OBJECTIVE STATEMENT
22 y/o male with no significant PMH presents to the ED for evaluation of having a hard time dealing with his anger management and moods of feeling depressed. pt reports he used to see a therapist when he was 19 y/o, but after 3 sessions stopped going to one. pt reports he uses to do xanax, percocet, and acid when he was 19 y/o but has stopped, only smokes marijuana and denies use of marijuana. pt reports he gets angry often and punches things or breaks objects but does not injure himself or others. pt reports sometimes he has thoughts of hurting himself but does not have a plan. pt reports when he feels that way he goes to get a tattoo to soothe him. pt reports he has his girlfriend to talk to who suggest he seek help. pt reports he tried to get help from [employer], his job, through his benefits but has not has success. pt reports his family does not understand him and he comes from a [place] family who does not believe in taking medication for mental health. pt reports he is just trying to get a connect with a psychiatrist of therapist to help him control and handle his emotions. pt reports he came today because he did know how else to get the help he has been looking for and his girlfriend has bee encouraging him to get. pt denies current suicidal thoughts. pt denies homicidal ideations, visual or auditory hallucinations, illicit drug use, use of alcohol, fever, chills, headache, or dizziness.

## 2022-06-02 NOTE — ED ADULT NURSE NOTE - NSIMPLEMENTINTERV_GEN_ALL_ED
Implemented All Universal Safety Interventions:  [place] to call system. Call bell, personal items and telephone within reach. Instruct patient to call for assistance. Room bathroom lighting operational. Non-slip footwear when patient is off stretcher. Physically safe environment: no spills, clutter or unnecessary equipment. Stretcher in lowest position, wheels locked, appropriate side rails in place.

## 2022-06-02 NOTE — ED PROVIDER NOTE - PATIENT PORTAL LINK FT
You can access the FollowMyHealth Patient Portal offered by [facility] by registering at the following website: [email or web address]. By joining [facility]’s FollowMyHealth portal, you will also be able to view your health information using other applications (apps) compatible with our system.

## 2022-06-02 NOTE — ED BEHAVIORAL HEALTH ASSESSMENT NOTE - OTHER PAST PSYCHIATRIC HISTORY (INCLUDE DETAILS REGARDING ONSET, COURSE OF ILLNESS, INPATIENT/OUTPATIENT TREATMENT)
Seen in ED on Nov 2021, discharged with outpatient followup however patient has been unable to obtain outpatient care due to insurance issues    Has had therapy at age 18, did not find it helpful but is willing to retry

## 2022-06-13 ENCOUNTER — EMERGENCY (EMERGENCY)
Facility: HOSPITAL | Age: 22
LOS: 0 days | Discharge: HOME | End: 2022-06-13
Attending: EMERGENCY MEDICINE | Admitting: EMERGENCY MEDICINE
Payer: MEDICAID

## 2022-06-13 VITALS
DIASTOLIC BLOOD PRESSURE: 70 MMHG | OXYGEN SATURATION: 99 % | HEART RATE: 73 BPM | TEMPERATURE: 98 F | SYSTOLIC BLOOD PRESSURE: 135 MMHG | RESPIRATION RATE: 16 BRPM

## 2022-06-13 DIAGNOSIS — Z76.0 ENCOUNTER FOR ISSUE OF REPEAT PRESCRIPTION: ICD-10-CM

## 2022-06-13 DIAGNOSIS — F41.9 ANXIETY DISORDER, UNSPECIFIED: ICD-10-CM

## 2022-06-13 PROCEDURE — 99283 EMERGENCY DEPT VISIT LOW MDM: CPT

## 2022-06-13 RX ORDER — HYDROXYZINE HCL 10 MG
1 TABLET ORAL
Qty: 20 | Refills: 0
Start: 2022-06-13 | End: 2022-06-19

## 2022-06-13 RX ORDER — HYDROXYZINE HCL 10 MG
25 TABLET ORAL ONCE
Refills: 0 | Status: COMPLETED | OUTPATIENT
Start: 2022-06-13 | End: 2022-06-13

## 2022-06-13 RX ADMIN — Medication 25 MILLIGRAM(S): at 22:00

## 2022-06-13 NOTE — ED ADULT NURSE NOTE - OBJECTIVE STATEMENT
Pt c/o anxiety, states he ran out of his meds and has been trying to get an appointment with a psychiatrist but unable get an appointment at this time.

## 2022-06-13 NOTE — ED PROVIDER NOTE - NSFOLLOWUPCLINICS_GEN_ALL_ED_FT
[facility]  OP Mental Health  [address] [place] [id number]  Phone: [phone]  Fax:

## 2022-06-13 NOTE — ED PROVIDER NOTE - PATIENT PORTAL LINK FT
You can access the FollowMyHealth Patient Portal offered by [facility] by registering at the following website: [email or web address]. By joining [facility]’s FollowMyHealth portal, you will also be able to view your health information using other applications (apps) compatible with our system.

## 2022-06-13 NOTE — ED PROVIDER NOTE - NS ED ROS FT
CONSTITUTIONAL: Negatve   SKIN: Negatve   HEAD: Negatve   EYES: Negatve   ENT: Negatve   NECK: Negatve   CARD:Negatve   RESP:Negatve   ABD: Negatve   EXT: Negatve  LYMPH: Negatve   NEURO: Negatve   PSYCH: see hpi

## 2022-06-13 NOTE — ED PROVIDER NOTE - NSFOLLOWUPINSTRUCTIONS_ED_ALL_ED_FT
Call the referral number for outpatient mental health follow up.  Take medications as prescribed if needed.  Return for any worsening symptoms.

## 2022-06-22 ENCOUNTER — OUTPATIENT (OUTPATIENT)
Dept: OUTPATIENT SERVICES | Facility: HOSPITAL | Age: 22
LOS: 1 days | Discharge: HOME | End: 2022-06-22

## 2022-06-29 ENCOUNTER — OUTPATIENT (OUTPATIENT)
Dept: OUTPATIENT SERVICES | Facility: HOSPITAL | Age: 22
LOS: 1 days | Discharge: HOME | End: 2022-06-29

## 2022-06-29 ENCOUNTER — APPOINTMENT (OUTPATIENT)
Dept: PSYCHIATRY | Facility: CLINIC | Age: 22
End: 2022-06-29

## 2022-06-30 DIAGNOSIS — F41.1 GENERALIZED ANXIETY DISORDER: ICD-10-CM

## 2022-07-20 ENCOUNTER — APPOINTMENT (OUTPATIENT)
Dept: PSYCHIATRY | Facility: CLINIC | Age: 22
End: 2022-07-20

## 2022-07-20 ENCOUNTER — OUTPATIENT (OUTPATIENT)
Dept: OUTPATIENT SERVICES | Facility: HOSPITAL | Age: 22
LOS: 1 days | Discharge: HOME | End: 2022-07-20

## 2022-07-20 DIAGNOSIS — F41.1 GENERALIZED ANXIETY DISORDER: ICD-10-CM

## 2022-07-20 DIAGNOSIS — G47.00 INSOMNIA, UNSPECIFIED: ICD-10-CM

## 2022-07-20 PROCEDURE — 90792 PSYCH DIAG EVAL W/MED SRVCS: CPT

## 2022-07-20 RX ORDER — CITALOPRAM HYDROBROMIDE 10 MG/1
10 TABLET, FILM COATED ORAL
Qty: 30 | Refills: 0 | Status: DISCONTINUED | COMMUNITY
Start: 2022-01-07

## 2022-07-20 RX ORDER — HYDROXYZINE HYDROCHLORIDE 50 MG/1
50 TABLET ORAL
Qty: 7 | Refills: 0 | Status: DISCONTINUED | COMMUNITY
Start: 2022-06-02

## 2022-07-20 RX ORDER — AMOXICILLIN 500 MG/1
500 TABLET, FILM COATED ORAL
Qty: 21 | Refills: 0 | Status: DISCONTINUED | COMMUNITY
Start: 2022-06-22

## 2022-07-26 ENCOUNTER — APPOINTMENT (OUTPATIENT)
Dept: PSYCHIATRY | Facility: CLINIC | Age: 22
End: 2022-07-26

## 2022-07-26 ENCOUNTER — OUTPATIENT (OUTPATIENT)
Dept: OUTPATIENT SERVICES | Facility: HOSPITAL | Age: 22
LOS: 1 days | Discharge: HOME | End: 2022-07-26

## 2022-07-26 DIAGNOSIS — F41.1 GENERALIZED ANXIETY DISORDER: ICD-10-CM

## 2022-07-26 DIAGNOSIS — G47.00 INSOMNIA, UNSPECIFIED: ICD-10-CM

## 2022-08-12 ENCOUNTER — APPOINTMENT (OUTPATIENT)
Dept: PSYCHIATRY | Facility: CLINIC | Age: 22
End: 2022-08-12

## 2022-08-17 ENCOUNTER — OUTPATIENT (OUTPATIENT)
Dept: OUTPATIENT SERVICES | Facility: HOSPITAL | Age: 22
LOS: 1 days | Discharge: HOME | End: 2022-08-17

## 2022-08-17 ENCOUNTER — APPOINTMENT (OUTPATIENT)
Dept: PSYCHIATRY | Facility: CLINIC | Age: 22
End: 2022-08-17

## 2022-08-17 DIAGNOSIS — G47.00 INSOMNIA, UNSPECIFIED: ICD-10-CM

## 2022-08-17 DIAGNOSIS — F41.1 GENERALIZED ANXIETY DISORDER: ICD-10-CM

## 2022-08-22 VITALS — WEIGHT: 130 LBS | BODY MASS INDEX: 19.7 KG/M2 | HEIGHT: 68 IN

## 2022-08-24 ENCOUNTER — APPOINTMENT (OUTPATIENT)
Dept: PSYCHIATRY | Facility: CLINIC | Age: 22
End: 2022-08-24

## 2022-08-24 ENCOUNTER — OUTPATIENT (OUTPATIENT)
Dept: OUTPATIENT SERVICES | Facility: HOSPITAL | Age: 22
LOS: 1 days | Discharge: HOME | End: 2022-08-24

## 2022-08-24 DIAGNOSIS — F34.0 CYCLOTHYMIC DISORDER: ICD-10-CM

## 2022-08-24 DIAGNOSIS — G47.00 INSOMNIA, UNSPECIFIED: ICD-10-CM

## 2022-08-24 DIAGNOSIS — F41.1 GENERALIZED ANXIETY DISORDER: ICD-10-CM

## 2022-08-24 PROCEDURE — 99214 OFFICE O/P EST MOD 30 MIN: CPT

## 2022-09-08 ENCOUNTER — APPOINTMENT (OUTPATIENT)
Dept: PSYCHIATRY | Facility: CLINIC | Age: 22
End: 2022-09-08

## 2022-09-08 ENCOUNTER — OUTPATIENT (OUTPATIENT)
Dept: OUTPATIENT SERVICES | Facility: HOSPITAL | Age: 22
LOS: 1 days | Discharge: HOME | End: 2022-09-08

## 2022-09-08 DIAGNOSIS — F34.0 CYCLOTHYMIC DISORDER: ICD-10-CM

## 2022-09-08 DIAGNOSIS — G47.00 INSOMNIA, UNSPECIFIED: ICD-10-CM

## 2022-09-08 DIAGNOSIS — F41.1 GENERALIZED ANXIETY DISORDER: ICD-10-CM

## 2022-09-08 PROCEDURE — 99214 OFFICE O/P EST MOD 30 MIN: CPT | Mod: 95

## 2022-09-21 ENCOUNTER — OUTPATIENT (OUTPATIENT)
Dept: OUTPATIENT SERVICES | Facility: HOSPITAL | Age: 22
LOS: 1 days | Discharge: HOME | End: 2022-09-21

## 2022-09-21 ENCOUNTER — APPOINTMENT (OUTPATIENT)
Dept: PSYCHIATRY | Facility: CLINIC | Age: 22
End: 2022-09-21

## 2022-09-21 DIAGNOSIS — F41.1 GENERALIZED ANXIETY DISORDER: ICD-10-CM

## 2022-09-21 DIAGNOSIS — G47.00 INSOMNIA, UNSPECIFIED: ICD-10-CM

## 2022-09-21 DIAGNOSIS — F34.0 CYCLOTHYMIC DISORDER: ICD-10-CM

## 2022-09-21 PROCEDURE — 99214 OFFICE O/P EST MOD 30 MIN: CPT

## 2022-10-05 ENCOUNTER — APPOINTMENT (OUTPATIENT)
Dept: PSYCHIATRY | Facility: CLINIC | Age: 22
End: 2022-10-05

## 2022-10-05 ENCOUNTER — NON-APPOINTMENT (OUTPATIENT)
Age: 22
End: 2022-10-05

## 2022-10-14 ENCOUNTER — OUTPATIENT (OUTPATIENT)
Dept: OUTPATIENT SERVICES | Facility: HOSPITAL | Age: 22
LOS: 1 days | Discharge: HOME | End: 2022-10-14

## 2022-10-14 ENCOUNTER — APPOINTMENT (OUTPATIENT)
Dept: PSYCHIATRY | Facility: CLINIC | Age: 22
End: 2022-10-14

## 2022-10-14 DIAGNOSIS — F34.0 CYCLOTHYMIC DISORDER: ICD-10-CM

## 2022-10-14 DIAGNOSIS — F41.1 GENERALIZED ANXIETY DISORDER: ICD-10-CM

## 2022-10-14 DIAGNOSIS — G47.00 INSOMNIA, UNSPECIFIED: ICD-10-CM

## 2022-10-14 NOTE — PHYSICAL EXAM
[Average] : average [Cooperative] : cooperative [Depressed] : depressed [Full] : full [Clear] : clear [Linear/Goal Directed] : linear/goal directed [WNL] : within normal limits

## 2022-10-14 NOTE — PLAN
[FreeTextEntry2] : Treatment Planning: \par Goal #1 " I want to manage my emotions and to have a healthy relationship" \par \par Obj#1 Patient will express feelings and at least 2 sources of mood disturbance. \par Obj#2 Patient will learn and utilize at least 2 positive coping, conflict resolution, or CBT methods, \par \par Goal #2 " I don't want to feel calm and not over think" \par \par Obj#1 Patient will identify at least 2 trigger of anxiety \par Obj#2 Patient will learn at least 2 relaxation methods other than use of substances. \par  [[facility]] : [facility]  [Supportive Therapy] : Supportive Therapy [de-identified] : Patient reports that he had to take a leave of absence from his job since he has been having difficulty functioning and crying at work often. He reports still having other symptoms such as poor appetite, lack of motivation, wanting to stay in bed all the time, withdrawing from people, feeling empty, and not having anything to look forward to. He sometimes wishes he was not here but denies any self-harm or suicide thoughts. He denies any thoughts of cutting and now thinks that he would not want to do that to his body. He reports some impulsive spending,coloring his hair, smoking cigarettes which he would like to reduce but not quit, and smoking marijuana to self medicate.He is not sure if he needs a medication change but know he would feel worse if he was not on medication. He will be seeing psychiatrist next Wednesday.   He is trying to find happiness within himself and without his ex girlfriends. He reports spending time reading which is a positive coping method for him, as well as, going for a walk. He reports that having his dogs is also helpful and he has to friends he talks to but not about his personal issues.   [Recommended Frequency of Visits: ____] : Recommended frequency of visits: [unfilled] [Return in ____ week(s)] : Return in [unfilled] week(s) [FreeTextEntry1] : Patient will focus on positive coping and relaxation methods. He will continue medication regimen, follow up with psychiatrist, and follow up with therapy on 10/28.

## 2022-10-19 ENCOUNTER — OUTPATIENT (OUTPATIENT)
Dept: OUTPATIENT SERVICES | Facility: HOSPITAL | Age: 22
LOS: 1 days | Discharge: HOME | End: 2022-10-19

## 2022-10-19 ENCOUNTER — APPOINTMENT (OUTPATIENT)
Dept: PSYCHIATRY | Facility: CLINIC | Age: 22
End: 2022-10-19

## 2022-10-19 DIAGNOSIS — G47.00 INSOMNIA, UNSPECIFIED: ICD-10-CM

## 2022-10-19 DIAGNOSIS — F34.0 CYCLOTHYMIC DISORDER: ICD-10-CM

## 2022-10-19 DIAGNOSIS — F41.1 GENERALIZED ANXIETY DISORDER: ICD-10-CM

## 2022-10-19 PROCEDURE — 99214 OFFICE O/P EST MOD 30 MIN: CPT

## 2022-10-19 RX ORDER — HYDROXYZINE PAMOATE 50 MG/1
50 CAPSULE ORAL EVERY 8 HOURS
Qty: 90 | Refills: 1 | Status: DISCONTINUED | COMMUNITY
Start: 2022-06-13 | End: 2022-10-19

## 2022-10-28 ENCOUNTER — APPOINTMENT (OUTPATIENT)
Dept: PSYCHIATRY | Facility: CLINIC | Age: 22
End: 2022-10-28

## 2022-10-28 ENCOUNTER — OUTPATIENT (OUTPATIENT)
Dept: OUTPATIENT SERVICES | Facility: HOSPITAL | Age: 22
LOS: 1 days | Discharge: HOME | End: 2022-10-28

## 2022-10-28 DIAGNOSIS — G47.00 INSOMNIA, UNSPECIFIED: ICD-10-CM

## 2022-10-28 DIAGNOSIS — F41.1 GENERALIZED ANXIETY DISORDER: ICD-10-CM

## 2022-10-28 DIAGNOSIS — F34.0 CYCLOTHYMIC DISORDER: ICD-10-CM

## 2022-10-28 NOTE — PHYSICAL EXAM
[Average] : average [Cooperative] : cooperative [Euthymic] : euthymic [Full] : full [Clear] : clear [Soft] : soft [Linear/Goal Directed] : linear/goal directed [WNL] : within normal limits

## 2022-10-28 NOTE — PLAN
[FreeTextEntry2] : Treatment Planning: \par Goal #1 " I want to manage my emotions and to have a healthy relationship" \par \par Obj#1 Patient will express feelings and at least 2 sources of mood disturbance. \par Obj#2 Patient will learn and utilize at least 2 positive coping, conflict resolution, or CBT methods, \par \par Goal #2 " I don't want to feel calm and not over think" \par \par Obj#1 Patient will identify at least 2 trigger of anxiety \par Obj#2 Patient will learn at least 2 relaxation methods other than use of substances. \par  [[facility]] : [facility]  [Supportive Therapy] : Supportive Therapy [de-identified] : Patient reports that he is back at work and functioning better. He reports that his crying spells are less and his appetite has improved so he is able to eat more. He is not feeling as depressed since he is taking a higher dosage of medication. He reports that when he is not working he still stays home rather than go out with his friends but he has been spending his time reading a psychological fiction book which he enjoys. He reports that reading also helps him relax, not be internally focused with his thoughts,  and helps him  cope with his breakup.  [Recommended Frequency of Visits: ____] : Recommended frequency of visits: [unfilled] [Return in ____ week(s)] : Return in [unfilled] week(s) [FreeTextEntry1] : Patient will focus on positive coping and relaxation methods, continue work activity, continue medication regimen, and follow up with therapy on 11/11.

## 2022-11-09 NOTE — SOCIAL HISTORY
[With Family] : lives with family [Employed] : employed [Never ] : never  [High School] : high school [None] : none [Yes] : yes [FreeTextEntry2] : full time at [employer] 1 year [TextBox_7] : social drinker, on special occasions [FreeTextEntry1] : regular marijuana use [FreeTextEntry9] : unprescribed Percocet recreationally, last use 2020 [TextBox_52] : unprescribed Xanax recreationally, last use 2020 [TextBox_62] : acid, last use 2020

## 2022-11-09 NOTE — REASON FOR VISIT
[Patient] : Patient [FreeTextEntry1] : "I am a little better, but I had to take leave from work last couple weeks for anxiety and mood swings, I could use an increase for mood lability and anxiety."

## 2022-11-09 NOTE — PAST MEDICAL HISTORY
[FreeTextEntry1] : 22 y/o single M referred by ED following ED visits 6/2/22 and 6/13/22 for worsening anxiety and prescribed hydroxyzine with improvement (“more calm with it”), regular marijuana user, PMH hearing impaired R ear, no reported PPH, saw therapist “for a few sessions” at unknown organization at 19 y/o but discontinued treatment due to “I didn’t feel comfortable there,” has never before seen psych, currently employed full time at [employer] 1 year, currently domiciled with mother, stepfather (who raised him from birth), and 3 ½  sisters (15 y/o F, 10 y/o F, 7 y/o F) in private apartment (limited relationship with biological father). As per [employer], Mickey originally contacted [facility] OPD 12/2021 and endorsed substance use at that time and was referred to [facility] but Mickey did not follow-up. When asked, Mickey states he last used unprescribed Xanax, Percocet, and acid recreationally 2 years ago, denies any current use (notes while on acid saw colors/lights that has continued and worried about permanency, encouraged to follow up with eye doctor?). Mickey reports “extreme anxiety and anger issues,” describing anxiety as excessive worry, worsening when in social situations, “I overthink a lot and the more I overthink I end up in a bad place in my mind and it’s difficult to be around people,” resulting in self-isolation. Panic episodes once per week described as difficulty breathing, racing thoughts, shakiness, and “felt like I couldn’t walk,” most recent episode 6/28/22. Mickey reports current mild depressed mood that has improved over the last 2 weeks, decreased appetite, and difficulty sleeping (works night shifts). Mickey notes he broke up in his girlfriend 5/2022 and at that time experienced worsening decreased appetite, 20 lbs unintentional weight loss, excessive crying, loss of motivation “difficulty going to work for 2 weeks,” and hopelessness that has since improved due to getting back together recently. (+) recent passive SI “I had thoughts of wanting to hurt myself, I wish I didn’t wake up,” “it’s when I feel like my life isn’t going anywhere,” none currently, no intent or plan, no hx of SA. Mickey reports episodes of difficulty with emotional regulation “when I don’t know how to handle a situation,” specifically high stress situations, described as “irritability, explosiveness, and frustration” resulting in physical aggression towards property, no HI. In 2021 he injured his hand by punching a piece of metal. No IPP admissions.

## 2022-11-09 NOTE — CURRENT PSYCHIATRIC SYMPTOMS
[Depressed Mood] : depressed mood [Decreased Concentration] : decreased concentrating ability [Insomnia] : insomnia [Anorexia] : anorexia [Highly Irritable] : high irritability [Distractibility] : distractibility [Excessive Worry] : excessive worry [Panic] : panic  [Anhedonia] : no anhedonia [Guilt] : not feeling guilty [Hyperphagia] : no hyperphagia [Hypersomnia] : no ~T hypersomnia [Psychomotor Retardation] : no psychomotor retardation [Euphoria] : no euphoria [Increased Activity] : no increased in activity [Talkativeness] : no talkativeness [Grandeur] : no feelings of grandeur [Buying Sprees] : no buying sprees [Hypersexuality] : denied hypersexuality [Dec Need For Sleep] : no decreased need for sleep [Delusions] : no ~T delusions [Hallucination Visual] : no visual hallucinations [Hallucination Auditory] : no auditory hallucinations [Hallucination Tactile] : no tactile hallucinations [Thought Disorder] : ~T a thought disorder was not noted [Ruminations] : no rumination disorder [Obsessions] : no obsessions [Re-experiencing] : no re-experiencing [Restlessness] : no restlessness [Hypochondriasis] : no hypochondriasis [de-identified] : (+) decreased appetite [de-identified] : irritability possibly related to anxiety?, impulsivity with spending

## 2022-11-09 NOTE — HISTORY OF PRESENT ILLNESS
[No] : no [None] : none [Responsibility to family or others] : responsibility to family or others [Identifies reasons for living] : identifies reasons for living [Future oriented] : future oriented [Engaged in work or school] : engaged in work or school [None Known] : none known [Yes] : yes [Irritability] : irritability [Residential stability] : residential stability [Relationship stability] : relationship stability [Employment stability] : employment stability [Affective stability] : affective stability [FreeTextEntry1] : This is a 21 year old patient who presents for medication management.  The patient reports mood lability, fair sleep and poor appetite.  The patient relays some crying spells, irritability, lack of energy, focus, but denies psychosis at this time.  The patient has some ongoing  anxiety that impairs their daily functioning. The patient denies any suicidal ideation, homicidal ideation, no auditory or visual hallucinations, or paranoid ideation.  The patient has no medical complaints. The patient denies any alcohol use, and is not smoking at this time.  He will try to reduce and stop cannabis use. I received the patient's updated physical and labs from their PCP.  Coping skills were discussed and a safety plan was provided.  The patient was educated on the risks, benefits, and alternatives of their psychiatric medications.  The patient will engage in psychotherapy at this time.  The patient consents to ongoing medication management.\par \par Will discontinue hydroxyzine prn, and raise seroquel to 300mg at bedtime, and gabapentin 400mg po TID for anxiety.\par \par 11/9:patient called and reports low mood, isolating, risks, benefits discussed, will raise seroquel to 400mg at bedtime and call again in 1 week [TextBox_32] : +) recent passive SI “I had thoughts of wanting to hurt myself, I wish I didn’t wake up,” “it’s when I feel like my life isn’t going anywhere,” none current, no intent or plan, no hx of SA. [de-identified] : \par  [TextBox_52] : +) recent passive SI “I had thoughts of wanting to hurt myself, I wish I didn’t wake up,” “it’s when I feel like my life isn’t going anywhere,” none current, no intent or plan, no hx of SA.

## 2022-11-09 NOTE — DISCUSSION/SUMMARY
[FreeTextEntry1] : Patient has mood lability and ongoing anxiety, improving, good sleep.\par \par Adjust medication:\par 1. gabapentin 300mg po TID to 400mg po TID\par 2. Discontinue hydroxyzine 50mg prn, not helpful\par 3. Raise Seroquel 200mg to 300mg at bedtime for mood/anxiety\par \par Follow up in 4 weeks, earlier if needed

## 2022-11-09 NOTE — FAMILY HISTORY
[FreeTextEntry1] : Family composition: never , no children\par Family history and background: raised by mother and stepfather (stepdad since birth), mother and biological father  before his birth, limited relationship with biological father "we talk rarely," 3 ½  sisters (17 y/o F, 10 y/o F, 7 y/o F) \par Family relationship: supportive/stressful with mother and stepfather, supportive relationships with 1/s sisters "I love them, they're just sister, we're good" \par Pertinent Family Medical, MH and Substance Use History including Adult Child of Alcoholic and child of substance abuse status; history of cancer and heart disease\par *feels like mental health hx in family but undiagnosed\par Step-father - alcohol abuse, not completely sober but has decreased alcohol use recently\par

## 2022-11-11 ENCOUNTER — APPOINTMENT (OUTPATIENT)
Dept: PSYCHIATRY | Facility: CLINIC | Age: 22
End: 2022-11-11

## 2022-11-11 ENCOUNTER — NON-APPOINTMENT (OUTPATIENT)
Age: 22
End: 2022-11-11

## 2022-11-16 ENCOUNTER — APPOINTMENT (OUTPATIENT)
Dept: PSYCHIATRY | Facility: CLINIC | Age: 22
End: 2022-11-16

## 2022-11-16 ENCOUNTER — OUTPATIENT (OUTPATIENT)
Dept: OUTPATIENT SERVICES | Facility: HOSPITAL | Age: 22
LOS: 1 days | Discharge: HOME | End: 2022-11-16

## 2022-11-16 DIAGNOSIS — F34.0 CYCLOTHYMIC DISORDER: ICD-10-CM

## 2022-11-16 DIAGNOSIS — G47.00 INSOMNIA, UNSPECIFIED: ICD-10-CM

## 2022-11-16 DIAGNOSIS — F41.1 GENERALIZED ANXIETY DISORDER: ICD-10-CM

## 2022-11-16 PROCEDURE — 99214 OFFICE O/P EST MOD 30 MIN: CPT

## 2022-11-16 RX ORDER — QUETIAPINE FUMARATE 200 MG/1
200 TABLET ORAL
Qty: 30 | Refills: 0 | Status: COMPLETED | COMMUNITY
Start: 2022-09-21

## 2022-11-16 RX ORDER — QUETIAPINE FUMARATE 300 MG/1
300 TABLET ORAL
Qty: 30 | Refills: 0 | Status: COMPLETED | COMMUNITY
Start: 2022-10-19

## 2022-11-16 RX ORDER — GABAPENTIN 100 MG/1
100 CAPSULE ORAL
Qty: 90 | Refills: 0 | Status: COMPLETED | COMMUNITY
Start: 2022-08-24

## 2022-11-16 RX ORDER — QUETIAPINE FUMARATE 100 MG/1
100 TABLET ORAL
Qty: 30 | Refills: 0 | Status: COMPLETED | COMMUNITY
Start: 2022-09-20

## 2022-11-16 RX ORDER — GABAPENTIN 300 MG/1
300 CAPSULE ORAL
Qty: 90 | Refills: 0 | Status: COMPLETED | COMMUNITY
Start: 2022-09-08

## 2022-11-22 ENCOUNTER — NON-APPOINTMENT (OUTPATIENT)
Age: 22
End: 2022-11-22

## 2022-11-22 NOTE — CURRENT PSYCHIATRIC SYMPTOMS
[Depressed Mood] : depressed mood [Decreased Concentration] : decreased concentrating ability [Insomnia] : insomnia [Anorexia] : anorexia [Highly Irritable] : high irritability [Distractibility] : distractibility [Excessive Worry] : excessive worry [Panic] : panic  [Anhedonia] : no anhedonia [Guilt] : not feeling guilty [Hyperphagia] : no hyperphagia [Hypersomnia] : no ~T hypersomnia [Psychomotor Retardation] : no psychomotor retardation [Euphoria] : no euphoria [Increased Activity] : no increased in activity [Talkativeness] : no talkativeness [Grandeur] : no feelings of grandeur [Buying Sprees] : no buying sprees [Hypersexuality] : denied hypersexuality [Dec Need For Sleep] : no decreased need for sleep [Delusions] : no ~T delusions [Hallucination Visual] : no visual hallucinations [Hallucination Auditory] : no auditory hallucinations [Hallucination Tactile] : no tactile hallucinations [Thought Disorder] : ~T a thought disorder was not noted [Ruminations] : no rumination disorder [Obsessions] : no obsessions [Re-experiencing] : no re-experiencing [Restlessness] : no restlessness [Hypochondriasis] : no hypochondriasis [de-identified] : (+) decreased appetite [de-identified] : irritability possibly related to anxiety?, impulsivity with spending

## 2022-11-22 NOTE — SOCIAL HISTORY
[With Family] : lives with family [Employed] : employed [Never ] : never  [High School] : high school [None] : none [Yes] : yes [FreeTextEntry2] : full time at [employer] 1 year [TextBox_7] : social drinker, on special occasions [FreeTextEntry1] : regular marijuana use [FreeTextEntry9] : unprescribed Percocet recreationally, last use 2020 [TextBox_52] : unprescribed Xanax recreationally, last use 2020 [TextBox_62] : acid, last use 2020

## 2022-11-22 NOTE — HISTORY OF PRESENT ILLNESS
[No] : no [None] : none [Responsibility to family or others] : responsibility to family or others [Identifies reasons for living] : identifies reasons for living [Future oriented] : future oriented [Engaged in work or school] : engaged in work or school [None Known] : none known [Yes] : yes [Irritability] : irritability [Residential stability] : residential stability [Relationship stability] : relationship stability [Employment stability] : employment stability [Affective stability] : affective stability [FreeTextEntry1] : This is a 22 year old patient who presents for medication management.  The patient reports stable mood, sleep and poor appetite.  The patient denies depression, horace, denies psychosis at this time.  The patient has some ongoing  anxiety that impairs their daily functioning. The patient denies any suicidal ideation, homicidal ideation, no auditory or visual hallucinations, or paranoid ideation.  The patient has no medical complaints. The patient denies any alcohol use, and is not smoking at this time.  He will try to reduce and stop cannabis use. I received the patient's updated physical and labs from their PCP.  Coping skills were discussed and a safety plan was provided.  The patient was educated on the risks, benefits, and alternatives of their psychiatric medications.  The patient will engage in psychotherapy at this time.  The patient consents to ongoing medication management.\par \par Continue current medications.\par \par 11/22:patient called, has more mood lability, will increase seroquel 100mg po qam, 400mg at bedtime, he denies any si, intent, or plan,  [TextBox_32] : +) recent passive SI “I had thoughts of wanting to hurt myself, I wish I didn’t wake up,” “it’s when I feel like my life isn’t going anywhere,” none current, no intent or plan, no hx of SA. [de-identified] : \par  [TextBox_52] : +) recent passive SI “I had thoughts of wanting to hurt myself, I wish I didn’t wake up,” “it’s when I feel like my life isn’t going anywhere,” none current, no intent or plan, no hx of SA.

## 2022-11-22 NOTE — PLAN
[FreeTextEntry4] : mood is improved\par \par anxiety is less\par \par health is good\par \par working and going to school

## 2022-11-22 NOTE — PAST MEDICAL HISTORY
[FreeTextEntry1] : 20 y/o single M referred by ED following ED visits 6/2/22 and 6/13/22 for worsening anxiety and prescribed hydroxyzine with improvement (“more calm with it”), regular marijuana user, PMH hearing impaired R ear, no reported PPH, saw therapist “for a few sessions” at unknown organization at 19 y/o but discontinued treatment due to “I didn’t feel comfortable there,” has never before seen psych, currently employed full time at [employer] 1 year, currently domiciled with mother, stepfather (who raised him from birth), and 3 ½  sisters (15 y/o F, 10 y/o F, 7 y/o F) in private apartment (limited relationship with biological father). As per [employer], Mickey originally contacted [facility] OPD 12/2021 and endorsed substance use at that time and was referred to [facility] but Mickey did not follow-up. When asked, Mickey states he last used unprescribed Xanax, Percocet, and acid recreationally 2 years ago, denies any current use (notes while on acid saw colors/lights that has continued and worried about permanency, encouraged to follow up with eye doctor?). Mickey reports “extreme anxiety and anger issues,” describing anxiety as excessive worry, worsening when in social situations, “I overthink a lot and the more I overthink I end up in a bad place in my mind and it’s difficult to be around people,” resulting in self-isolation. Panic episodes once per week described as difficulty breathing, racing thoughts, shakiness, and “felt like I couldn’t walk,” most recent episode 6/28/22. Mickey reports current mild depressed mood that has improved over the last 2 weeks, decreased appetite, and difficulty sleeping (works night shifts). Mickey notes he broke up in his girlfriend 5/2022 and at that time experienced worsening decreased appetite, 20 lbs unintentional weight loss, excessive crying, loss of motivation “difficulty going to work for 2 weeks,” and hopelessness that has since improved due to getting back together recently. (+) recent passive SI “I had thoughts of wanting to hurt myself, I wish I didn’t wake up,” “it’s when I feel like my life isn’t going anywhere,” none currently, no intent or plan, no hx of SA. Mickey reports episodes of difficulty with emotional regulation “when I don’t know how to handle a situation,” specifically high stress situations, described as “irritability, explosiveness, and frustration” resulting in physical aggression towards property, no HI. In 2021 he injured his hand by punching a piece of metal. No IPP admissions.

## 2022-11-22 NOTE — DISCUSSION/SUMMARY
[FreeTextEntry1] : Patient has stable mood, improved mood, less anxiety.\par Continue medication:\par 1. gabapentin 400mg po TID\par 2. Seroquel 400mg at bedtime for mood/anxiety\par \par Follow up in 4 weeks, earlier if needed

## 2022-11-23 ENCOUNTER — APPOINTMENT (OUTPATIENT)
Dept: PSYCHIATRY | Facility: CLINIC | Age: 22
End: 2022-11-23

## 2022-11-30 ENCOUNTER — APPOINTMENT (OUTPATIENT)
Dept: PSYCHIATRY | Facility: CLINIC | Age: 22
End: 2022-11-30

## 2022-11-30 ENCOUNTER — OUTPATIENT (OUTPATIENT)
Dept: OUTPATIENT SERVICES | Facility: HOSPITAL | Age: 22
LOS: 1 days | Discharge: HOME | End: 2022-11-30

## 2022-11-30 DIAGNOSIS — F41.1 GENERALIZED ANXIETY DISORDER: ICD-10-CM

## 2022-11-30 DIAGNOSIS — G47.00 INSOMNIA, UNSPECIFIED: ICD-10-CM

## 2022-11-30 DIAGNOSIS — F34.0 CYCLOTHYMIC DISORDER: ICD-10-CM

## 2022-11-30 NOTE — REASON FOR VISIT
[Patient] : Patient [FreeTextEntry1] : Patient is a 22 year old male being seen for a followup therapy session. COVID screening negative.

## 2022-11-30 NOTE — PLAN
[FreeTextEntry2] : Treatment Planning: \par Goal #1 " I want to manage my emotions and to have a healthy relationship" \par \par Obj#1 Patient will express feelings and at least 2 sources of mood disturbance. \par Obj#2 Patient will learn and utilize at least 2 positive coping, conflict resolution, or CBT methods, \par \par Goal #2 " I don't want to feel calm and not over think" \par \par Obj#1 Patient will identify at least 2 trigger of anxiety \par Obj#2 Patient will learn at least 2 relaxation methods other than use of substances. \par  [[facility]] : [facility]  [Supportive Therapy] : Supportive Therapy [de-identified] : Patient reports that overall he has been good. He reports that he has been functioning well at work and his appetite has been better. He reports having a crying spell which was triggered by seeing his ex girlfriend with her new boyfriend. Spoke about incident and still having mixed feelings related to his breakup. He reports that he has always been sensitive. He reports that otherwise his crying spells have been less and he has been using  positive self-talk or thought changing methods.He reports that medication and therapy have been helpful. He wants to continue to work on himself and become more independent so he can eventually have a mature and healthy relationship. He reports that his friends think he has a "split personality" and has been reading about. He also asked for more information about it and if it's possible that he has it although he does not think he does. He reports that he is recently having some problems with his memory especially when it comes to his appointments.  Spoke about ways to manage such as writing information or setting reminders.  [Recommended Frequency of Visits: ____] : Recommended frequency of visits: [unfilled] [Return in ____ week(s)] : Return in [unfilled] week(s) [FreeTextEntry1] : Patient will focus on positive coping, self-care, and CBT methods. He will continue medication regimen and followup with therapy on 12/14

## 2022-12-13 ENCOUNTER — NON-APPOINTMENT (OUTPATIENT)
Age: 22
End: 2022-12-13

## 2022-12-14 ENCOUNTER — OUTPATIENT (OUTPATIENT)
Dept: OUTPATIENT SERVICES | Facility: HOSPITAL | Age: 22
LOS: 1 days | Discharge: HOME | End: 2022-12-14

## 2022-12-14 ENCOUNTER — APPOINTMENT (OUTPATIENT)
Dept: PSYCHIATRY | Facility: CLINIC | Age: 22
End: 2022-12-14

## 2022-12-14 DIAGNOSIS — G47.00 INSOMNIA, UNSPECIFIED: ICD-10-CM

## 2022-12-14 DIAGNOSIS — F41.1 GENERALIZED ANXIETY DISORDER: ICD-10-CM

## 2022-12-14 DIAGNOSIS — F34.0 CYCLOTHYMIC DISORDER: ICD-10-CM

## 2022-12-14 PROCEDURE — 99214 OFFICE O/P EST MOD 30 MIN: CPT | Mod: 95

## 2022-12-14 NOTE — HISTORY OF PRESENT ILLNESS
[No] : no [None] : none [Responsibility to family or others] : responsibility to family or others [Identifies reasons for living] : identifies reasons for living [Future oriented] : future oriented [Engaged in work or school] : engaged in work or school [None Known] : none known [Yes] : yes [Irritability] : irritability [Residential stability] : residential stability [Relationship stability] : relationship stability [Employment stability] : employment stability [Affective stability] : affective stability [FreeTextEntry1] : This is a 22 year old patient who presents for medication management.  The patient reports some depressed mood, sleep and poor appetite.  The patient denies depression, horace, denies psychosis at this time.  The patient has some ongoing  anxiety that impairs their daily functioning. The patient denies any suicidal ideation, homicidal ideation, no auditory or visual hallucinations, or paranoid ideation.  The patient has no medical complaints. The patient denies any alcohol use, and is not smoking at this time.  He will try to reduce and stop cannabis use. I received the patient's updated physical and labs from their PCP.  Coping skills were discussed and a safety plan was provided.  The patient was educated on the risks, benefits, and alternatives of their psychiatric medications.  The patient will engage in psychotherapy at this time.  The patient consents to ongoing medication management.\par \par Adjust medications. [TextBox_32] : +) recent passive SI “I had thoughts of wanting to hurt myself, I wish I didn’t wake up,” “it’s when I feel like my life isn’t going anywhere,” none current, no intent or plan, no hx of SA. [de-identified] : \par  [TextBox_52] : +) recent passive SI “I had thoughts of wanting to hurt myself, I wish I didn’t wake up,” “it’s when I feel like my life isn’t going anywhere,” none current, no intent or plan, no hx of SA.

## 2022-12-14 NOTE — PLAN
[FreeTextEntry4] : mood is little down\par \par anxiety is little more due to work stress\par \par health is good\par \par working and going to school

## 2022-12-14 NOTE — DISCUSSION/SUMMARY
[FreeTextEntry1] : Patient has stable mood, improved mood, less anxiety.\par Continue medication:\par 1. gabapentin 400mg po TID\par 2. Seroquel 110mg in am and 400mg at bedtime for mood/anxiety\par 3. add buspar 10mg po BID for mood/anxiety\par \par Follow up in 4 weeks, earlier if needed

## 2022-12-14 NOTE — FAMILY HISTORY
[FreeTextEntry1] : Family composition: never , no children\par Family history and background: raised by mother and stepfather (stepdad since birth), mother and biological father  before his birth, limited relationship with biological father "we talk rarely," 3 ½  sisters (17 y/o F, 10 y/o F, 9 y/o F) \par Family relationship: supportive/stressful with mother and stepfather, supportive relationships with 1/s sisters "I love them, they're just sister, we're good" \par Pertinent Family Medical, MH and Substance Use History including Adult Child of Alcoholic and child of substance abuse status; history of cancer and heart disease\par *feels like mental health hx in family but undiagnosed\par Step-father - alcohol abuse, not completely sober but has decreased alcohol use recently\par

## 2022-12-14 NOTE — PAST MEDICAL HISTORY
[FreeTextEntry1] : 20 y/o single M referred by ED following ED visits 6/2/22 and 6/13/22 for worsening anxiety and prescribed hydroxyzine with improvement (“more calm with it”), regular marijuana user, PMH hearing impaired R ear, no reported PPH, saw therapist “for a few sessions” at unknown organization at 19 y/o but discontinued treatment due to “I didn’t feel comfortable there,” has never before seen psych, currently employed full time at [employer] 1 year, currently domiciled with mother, stepfather (who raised him from birth), and 3 ½  sisters (17 y/o F, 10 y/o F, 7 y/o F) in private apartment (limited relationship with biological father). As per [employer], Mickey originally contacted [facility] 12/2021 and endorsed substance use at that time and was referred to [facility] but Mickey did not follow-up. When asked, Mickey states he last used unprescribed Xanax, Percocet, and acid recreationally 2 years ago, denies any current use (notes while on acid saw colors/lights that has continued and worried about permanency, encouraged to follow up with eye doctor?). Mickey reports “extreme anxiety and anger issues,” describing anxiety as excessive worry, worsening when in social situations, “I overthink a lot and the more I overthink I end up in a bad place in my mind and it’s difficult to be around people,” resulting in self-isolation. Panic episodes once per week described as difficulty breathing, racing thoughts, shakiness, and “felt like I couldn’t walk,” most recent episode 6/28/22. Mickey reports current mild depressed mood that has improved over the last 2 weeks, decreased appetite, and difficulty sleeping (works night shifts). Mickey notes he broke up in his girlfriend 5/2022 and at that time experienced worsening decreased appetite, 20 lbs unintentional weight loss, excessive crying, loss of motivation “difficulty going to work for 2 weeks,” and hopelessness that has since improved due to getting back together recently. (+) recent passive SI “I had thoughts of wanting to hurt myself, I wish I didn’t wake up,” “it’s when I feel like my life isn’t going anywhere,” none currently, no intent or plan, no hx of SA. Mickey reports episodes of difficulty with emotional regulation “when I don’t know how to handle a situation,” specifically high stress situations, described as “irritability, explosiveness, and frustration” resulting in physical aggression towards property, no HI. In 2021 he injured his hand by punching a piece of metal. No IPP admissions.

## 2022-12-14 NOTE — CURRENT PSYCHIATRIC SYMPTOMS
[Depressed Mood] : depressed mood [Decreased Concentration] : decreased concentrating ability [Insomnia] : insomnia [Anorexia] : anorexia [Highly Irritable] : high irritability [Distractibility] : distractibility [Excessive Worry] : excessive worry [Panic] : panic  [Anhedonia] : no anhedonia [Guilt] : not feeling guilty [Hyperphagia] : no hyperphagia [Hypersomnia] : no ~T hypersomnia [Psychomotor Retardation] : no psychomotor retardation [Euphoria] : no euphoria [Increased Activity] : no increased in activity [Talkativeness] : no talkativeness [Grandeur] : no feelings of grandeur [Buying Sprees] : no buying sprees [Hypersexuality] : denied hypersexuality [Dec Need For Sleep] : no decreased need for sleep [Delusions] : no ~T delusions [Hallucination Visual] : no visual hallucinations [Hallucination Auditory] : no auditory hallucinations [Hallucination Tactile] : no tactile hallucinations [Thought Disorder] : ~T a thought disorder was not noted [Ruminations] : no rumination disorder [Obsessions] : no obsessions [Re-experiencing] : no re-experiencing [Restlessness] : no restlessness [Hypochondriasis] : no hypochondriasis [de-identified] : (+) decreased appetite [de-identified] : irritability possibly related to anxiety?, impulsivity with spending

## 2022-12-14 NOTE — SOCIAL HISTORY
[With Family] : lives with family [Employed] : employed [Never ] : never  [High School] : high school [None] : none [Yes] : yes [FreeTextEntry2] : full time at [employer] 1 year [TextBox_7] : social drinker, on special occasions [FreeTextEntry1] : regular marijuana use [FreeTextEntry9] : unprescribed Percocet recreationally, last use 2020 [TextBox_52] : unprescribed Xanax recreationally, last use 2020 [TextBox_62] : acid, last use 2020

## 2022-12-15 ENCOUNTER — NON-APPOINTMENT (OUTPATIENT)
Age: 22
End: 2022-12-15

## 2022-12-16 ENCOUNTER — OUTPATIENT (OUTPATIENT)
Dept: OUTPATIENT SERVICES | Facility: HOSPITAL | Age: 22
LOS: 1 days | Discharge: HOME | End: 2022-12-16

## 2022-12-16 ENCOUNTER — APPOINTMENT (OUTPATIENT)
Dept: PSYCHIATRY | Facility: CLINIC | Age: 22
End: 2022-12-16

## 2022-12-16 DIAGNOSIS — G47.00 INSOMNIA, UNSPECIFIED: ICD-10-CM

## 2022-12-16 DIAGNOSIS — F41.1 GENERALIZED ANXIETY DISORDER: ICD-10-CM

## 2022-12-16 DIAGNOSIS — F34.0 CYCLOTHYMIC DISORDER: ICD-10-CM

## 2022-12-16 NOTE — REASON FOR VISIT
[Home] : at home, [unfilled] , at the time of the visit. [Other Location: e.g. Home (Enter Location, City,State)___] : at [unfilled] [Patient] : Patient [FreeTextEntry1] : Patient is a 22 year old male being seen for a followup Telehealth therapy session.

## 2022-12-16 NOTE — PLAN
[FreeTextEntry2] : Treatment Planning: \par Goal #1 " I want to manage my emotions and to have a healthy relationship" \par \par Obj#1 Patient will express feelings and at least 2 sources of mood disturbance. \par Obj#2 Patient will learn and utilize at least 2 positive coping, conflict resolution, or CBT methods, \par \par Goal #2 " I don't want to feel calm and not over think" \par \par Obj#1 Patient will identify at least 2 trigger of anxiety \par Obj#2 Patient will learn at least 2 relaxation methods other than use of substances. \par  [[facility]] : [facility]  [Supportive Therapy] : Supportive Therapy [de-identified] : Patient complained of mood disturbance related to breakup and triggers or memories of his ex girlfriend. He reports that he still has crying spells at times but he has been able to function and otherwise is "OK". He reports feeling good to express himself in one last message to his ex girlfriend and is now setting limits by blocking her from phone calls and social media. He thinks that this will help him "get over her" and heal from loss of relationship. He denies any self-harm thoughts or thoughts of cutting. He reports that he will be very busy at work due to the holiday. He reports medication compliance and benefits of medication. He wants to continue to work on himself. He reports methods to distract himself such as reading and he looks forward to spending time with his family for holidays.  [Recommended Frequency of Visits: ____] : Recommended frequency of visits: [unfilled] [Return in ____ week(s)] : Return in [unfilled] week(s) [FreeTextEntry1] : Patient will continue to focus on positive coping methods, continue medication regimen, and followup with therapy after the holidays and therapist's return from vacation, on 1/6/23.

## 2023-01-06 ENCOUNTER — APPOINTMENT (OUTPATIENT)
Dept: PSYCHIATRY | Facility: CLINIC | Age: 23
End: 2023-01-06

## 2023-01-06 ENCOUNTER — OUTPATIENT (OUTPATIENT)
Dept: OUTPATIENT SERVICES | Facility: HOSPITAL | Age: 23
LOS: 1 days | Discharge: HOME | End: 2023-01-06

## 2023-01-06 DIAGNOSIS — F34.0 CYCLOTHYMIC DISORDER: ICD-10-CM

## 2023-01-06 DIAGNOSIS — G47.00 INSOMNIA, UNSPECIFIED: ICD-10-CM

## 2023-01-06 DIAGNOSIS — F41.1 GENERALIZED ANXIETY DISORDER: ICD-10-CM

## 2023-01-06 NOTE — PHYSICAL EXAM
[Average] : average [Cooperative] : cooperative [Depressed] : depressed [Full] : full [Clear] : clear [Soft] : soft [Linear/Goal Directed] : linear/goal directed [WNL] : within normal limits

## 2023-01-06 NOTE — REASON FOR VISIT
[Patient] : Patient [FreeTextEntry1] : Patient is a 22 year old male being seen for a followup therapy session.

## 2023-01-06 NOTE — PLAN
[FreeTextEntry2] : Treatment Planning: \par Goal #1 " I want to manage my emotions and to have a healthy relationship" \par \par Obj#1 Patient will express feelings and at least 2 sources of mood disturbance. \par Obj#2 Patient will learn and utilize at least 2 positive coping, conflict resolution, or CBT methods, \par \par Goal #2 " I don't want to feel calm and not over think" \par \par Obj#1 Patient will identify at least 2 trigger of anxiety \par Obj#2 Patient will learn at least 2 relaxation methods other than use of substances. \par  [[facility]] : [facility]  [Supportive Therapy] : Supportive Therapy [de-identified] : Patient reports that he feels he is having a "relapse"with his mood. He reports that he has been feeling depressed triggered by thoughts of his ex girlfriend and loss of the relationship. He denies any self-harm thoughts. He report some crying and conflict with "letting go" due to his emotions even though he knows he could never go back to his ex girlfriend.He reports getting some more tattoo since he like the way it feel to get a tattoo. He reports that he is having difficulty functioning at work and is thinking of reducing his work hours. He reports that his current work schedule causes difficulty for him to take his medication consistently or on time. He will be following up with psychiatrist next week. Spoke about loss of relationship, impact on how he feels towards future relationships, and  positive coping methods such as reading,and other activity to distract or keep him from dwelling on his thoughts of girlfriend. He reports that he is not motivated to go out with friends and that he spent most of his time with ex girlfriend.  [Recommended Frequency of Visits: ____] : Recommended frequency of visits: [unfilled] [Return in ____ week(s)] : Return in [unfilled] week(s) [FreeTextEntry1] : Patient will focus on positive coping methods, continue medication regimen, followup with psychiatrist, and followup with therapy on 1/20.

## 2023-01-09 ENCOUNTER — EMERGENCY (EMERGENCY)
Facility: HOSPITAL | Age: 23
LOS: 0 days | Discharge: HOME | End: 2023-01-10
Attending: EMERGENCY MEDICINE | Admitting: EMERGENCY MEDICINE
Payer: MEDICAID

## 2023-01-09 VITALS
HEART RATE: 70 BPM | SYSTOLIC BLOOD PRESSURE: 94 MMHG | DIASTOLIC BLOOD PRESSURE: 61 MMHG | RESPIRATION RATE: 16 BRPM | TEMPERATURE: 98 F | OXYGEN SATURATION: 96 %

## 2023-01-09 LAB
ALBUMIN SERPL ELPH-MCNC: 4.5 G/DL — SIGNIFICANT CHANGE UP (ref 3.5–5.2)
ALP SERPL-CCNC: 51 U/L — SIGNIFICANT CHANGE UP (ref 30–115)
ALT FLD-CCNC: 12 U/L — SIGNIFICANT CHANGE UP (ref 0–41)
ANION GAP SERPL CALC-SCNC: 9 MMOL/L — SIGNIFICANT CHANGE UP (ref 7–14)
AST SERPL-CCNC: 17 U/L — SIGNIFICANT CHANGE UP (ref 0–41)
BASOPHILS # BLD AUTO: 0.1 K/UL — SIGNIFICANT CHANGE UP (ref 0–0.2)
BASOPHILS NFR BLD AUTO: 1.1 % — HIGH (ref 0–1)
BILIRUB SERPL-MCNC: 0.3 MG/DL — SIGNIFICANT CHANGE UP (ref 0.2–1.2)
BUN SERPL-MCNC: 14 MG/DL — SIGNIFICANT CHANGE UP (ref 10–20)
CALCIUM SERPL-MCNC: 9.2 MG/DL — SIGNIFICANT CHANGE UP (ref 8.4–10.4)
CHLORIDE SERPL-SCNC: 102 MMOL/L — SIGNIFICANT CHANGE UP (ref 98–110)
CO2 SERPL-SCNC: 29 MMOL/L — SIGNIFICANT CHANGE UP (ref 17–32)
CREAT SERPL-MCNC: 0.8 MG/DL — SIGNIFICANT CHANGE UP (ref 0.7–1.5)
EGFR: 128 ML/MIN/1.73M2 — SIGNIFICANT CHANGE UP
EOSINOPHIL # BLD AUTO: 0.05 K/UL — SIGNIFICANT CHANGE UP (ref 0–0.7)
EOSINOPHIL NFR BLD AUTO: 0.5 % — SIGNIFICANT CHANGE UP (ref 0–8)
GLUCOSE SERPL-MCNC: 112 MG/DL — HIGH (ref 70–99)
HCT VFR BLD CALC: 44.6 % — SIGNIFICANT CHANGE UP (ref 42–52)
HGB BLD-MCNC: 15.2 G/DL — SIGNIFICANT CHANGE UP (ref 14–18)
IMM GRANULOCYTES NFR BLD AUTO: 0.4 % — HIGH (ref 0.1–0.3)
LYMPHOCYTES # BLD AUTO: 1.94 K/UL — SIGNIFICANT CHANGE UP (ref 1.2–3.4)
LYMPHOCYTES # BLD AUTO: 21.2 % — SIGNIFICANT CHANGE UP (ref 20.5–51.1)
MCHC RBC-ENTMCNC: 29.1 PG — SIGNIFICANT CHANGE UP (ref 27–31)
MCHC RBC-ENTMCNC: 34.1 G/DL — SIGNIFICANT CHANGE UP (ref 32–37)
MCV RBC AUTO: 85.4 FL — SIGNIFICANT CHANGE UP (ref 80–94)
MONOCYTES # BLD AUTO: 0.83 K/UL — HIGH (ref 0.1–0.6)
MONOCYTES NFR BLD AUTO: 9.1 % — SIGNIFICANT CHANGE UP (ref 1.7–9.3)
NEUTROPHILS # BLD AUTO: 6.18 K/UL — SIGNIFICANT CHANGE UP (ref 1.4–6.5)
NEUTROPHILS NFR BLD AUTO: 67.7 % — SIGNIFICANT CHANGE UP (ref 42.2–75.2)
NRBC # BLD: 0 /100 WBCS — SIGNIFICANT CHANGE UP (ref 0–0)
PLATELET # BLD AUTO: 305 K/UL — SIGNIFICANT CHANGE UP (ref 130–400)
POTASSIUM SERPL-MCNC: 4.2 MMOL/L — SIGNIFICANT CHANGE UP (ref 3.5–5)
POTASSIUM SERPL-SCNC: 4.2 MMOL/L — SIGNIFICANT CHANGE UP (ref 3.5–5)
PROT SERPL-MCNC: 7 G/DL — SIGNIFICANT CHANGE UP (ref 6–8)
RBC # BLD: 5.22 M/UL — SIGNIFICANT CHANGE UP (ref 4.7–6.1)
RBC # FLD: 12.1 % — SIGNIFICANT CHANGE UP (ref 11.5–14.5)
SODIUM SERPL-SCNC: 140 MMOL/L — SIGNIFICANT CHANGE UP (ref 135–146)
WBC # BLD: 9.14 K/UL — SIGNIFICANT CHANGE UP (ref 4.8–10.8)
WBC # FLD AUTO: 9.14 K/UL — SIGNIFICANT CHANGE UP (ref 4.8–10.8)

## 2023-01-09 PROCEDURE — 93010 ELECTROCARDIOGRAM REPORT: CPT

## 2023-01-09 PROCEDURE — 99284 EMERGENCY DEPT VISIT MOD MDM: CPT

## 2023-01-09 RX ORDER — SODIUM CHLORIDE 9 MG/ML
2000 INJECTION INTRAMUSCULAR; INTRAVENOUS; SUBCUTANEOUS ONCE
Refills: 0 | Status: COMPLETED | OUTPATIENT
Start: 2023-01-09 | End: 2023-01-09

## 2023-01-09 RX ADMIN — SODIUM CHLORIDE 2000 MILLILITER(S): 9 INJECTION INTRAMUSCULAR; INTRAVENOUS; SUBCUTANEOUS at 21:31

## 2023-01-09 NOTE — ED PROVIDER NOTE - NSFOLLOWUPCLINICS_GEN_ALL_ED_FT
[facility]  OP [facility]  [address] [place] [id number]  Phone: [phone]  Fax:   Follow Up Time: 1-3 Days

## 2023-01-09 NOTE — ED PROVIDER NOTE - PHYSICAL EXAMINATION
Physical Exam    Vital Signs: I have reviewed the initial vital signs.  Constitutional: appears stated age, no acute distress  Eyes: Conjunctiva pink, Sclera clear, PERRLA, EOMI.  ENT: OP is clear without exudates, normal dentition, normal gingival, tongue without swelling, TMs clear b/l, nasal turbinates without erythema or discharge, no lymphadenopathy.  Cardiovascular: S1 and S2, regular rate, regular rhythm, well-perfused extremities, radial pulses equal and 2+, pedal pulses 2+ and equal  Respiratory: unlabored respiratory effort, clear to auscultation bilaterally no wheezing, rales and rhonchi  Gastrointestinal: soft, non-tender abdomen, no pulsatile mass, normal bowl sounds  Musculoskeletal: supple neck, no lower extremity edema, no midline tenderness  Integumentary: warm, dry, no rash  Neurologic: awake, alert, oriented x4, cranial nerves II-XII grossly intact, extremities’ motor and sensory functions grossly intact. finger to nose intact, normal gait.  Psychiatric: appropriate mood, appropriate affect

## 2023-01-09 NOTE — ED ADULT NURSE NOTE - NSIMPLEMENTINTERV_GEN_ALL_ED
Implemented All Universal Safety Interventions:  [place] to call system. Call bell, personal items and telephone within reach. Instruct patient to call for assistance. Room bathroom lighting operational. Non-slip footwear when patient is off stretcher. Physically safe environment: no spills, clutter or unnecessary equipment. Stretcher in lowest position, wheels locked, appropriate side rails in place.

## 2023-01-09 NOTE — ED PROVIDER NOTE - NS ED ROS FT
Constitutional: (-) fever, (-) chills  Eyes: (-) visual changes  ENT: (-) nasal congestion  Cardiovascular: (-) chest pain, (-) syncope  Respiratory: (-) cough, (-) shortness of breath, (-) dyspnea,   Gastrointestinal: (-) vomiting, (-) diarrhea, (-)nausea,  Musculoskeletal: (-) neck pain, (-) back pain, (-) joint pain,  Integumentary: (-) rash, (-) edema, (-) bruises  Neurological: (-) headache, (-) dizziness, (-) tingling, (-)numbness,  Psychiatric: (-) hallucinations, (-)nervousness, (-)SI/HI  Peripheral Vascular: (-) leg swelling  :  (-)dysuria,  (-) hematuria  Allergic/Immunologic: (-) pruritus

## 2023-01-09 NOTE — ED PROVIDER NOTE - CLINICAL SUMMARY MEDICAL DECISION MAKING FREE TEXT BOX
22-year-old male with history of anxiety, depression, on gabapentin and quetiapine, presents with syncope. Onset at 5:30 PM today when he got up from a supine position and braced himself against his mother, falling forward onto mother, and passed out. Patient states at the time he states he could not feel his body, speak, or breathe. Interviewed separately from mom, states smokes marijuana daily but not today, and denies drug use. A few days ago was sick with the flu and had vomiting and has had poor p.o. intake since then, though no vomiting x3 days. Patient and mom deny head trauma, diarrhea, urinary symptoms, fever, cough, chest pain, and all other symptoms. Mom confirms that patient has been on his 2 medications for at least 4 months with no change, but sometimes after he takes Seroquel he feels dizzy. On exam, afebrile, hemodynamically stable, saturating well on room air, NAD, well appearing, laying comfortably in bed, no WOB, speaking full sentences, head NCAT, EOMI grossly, anicteric, MMM, no JVD, RRR, nml S1/S2, no m/r/g, lungs CTAB, no w/r/r, abd soft, NT, ND, nml BS, no rebound or guarding, AAO, CN's 3-12 grossly intact, WHITING spontaneously, no leg cyanosis or edema, skin warm, well perfused, no rashes or hives. Character low suspicion for CVA and no focal or localizing findings. No significant arrhythmia including QTc prolongation or e/o aortic outflow obstruction to warrant obs/admission for echo/monitoring. No anemia or bleeding or gross electrolyte abnormalities. No CP/SOB to suggest ACS or e/o DVT to suggest PE. No fever or specific infectious symptoms at this time, though recent vomiting and poor appetite likely contributing to his symptoms. Patient has been on the same dose of Seroquel for the past few months. Discussed with patient and mom ability for this medication to cause dizziness, need to keep hydrated, and imperative to follow-up closely with psychiatrist for likely medication changes. [place] syncope score low risk at -1. Patient is well appearing, NAD, afebrile, hemodynamically stable. Any available tests and studies were discussed with patient and mom. Discharged with instructions in further symptomatic care, return precautions, and need for psychiatry f/u.

## 2023-01-09 NOTE — ED PROVIDER NOTE - NSFOLLOWUPINSTRUCTIONS_ED_ALL_ED_FT
Please follow-up with your PCP in the next 1-3 days.   Follow-up in Psychiatry clinic in the next 1-3 days.      Fatigue    WHAT YOU NEED TO KNOW:    Fatigue is mental and physical exhaustion that does not get better with rest. Fatigue may make daily activities difficult or cause extreme sleepiness. It is normal to feel tired sometimes, but long-term fatigue may be a sign of serious illness.    DISCHARGE INSTRUCTIONS:    Return to the emergency department if:     You have chest pain.       You have difficulty breathing.     Contact your healthcare provider if:     You have a cough that gets worse, or does not go away.       You see blood in your urine or bowel movement.       You have numbness or tingling around your mouth or in an arm or leg.       You faint, feel dizzy, or have vision changes.       You have swelling in your lymph nodes.       You are a woman and have vaginal bleeding that is not normal for you, or is not expected.       You lose weight without trying, or you have trouble eating.       You feel weak or have muscle pain.       You have pain or swelling in your joints.      You have questions or concerns about your condition or care.     Follow up with your healthcare provider as directed: You may need more tests. Your healthcare provider may also refer you to a specialist. Write down your questions so you remember to ask them during your visits.    Manage fatigue:     Keep a fatigue diary. Include anything that makes you feel more tired or less tired. Bring the diary with you to follow-up visits with your provider.      Exercise as directed. Exercise can help you feel more alert. Exercise can also help you manage stress or relieve depression. Try to get at least 30 minutes of exercise most days of the week.      Keep a regular sleep schedule. Go to bed and wake up at the same times every day. Limit naps to 1 hour each day. A nap can improve fatigue, but a long nap may make it harder to go to sleep at night.      Plan and limit your activities. Limit the number of activities such as shopping and cleaning you do each day. If possible, try to spread out your trips throughout the week. Plan ahead so you are not rushing to get something done. Only do activities that you have the energy to complete. Take breaks between activities. Ask for help if you need it. Another person may be able to drive you or help with daily activities.      Eat a variety of healthy foods. Healthy foods include fruits, vegetables, whole-grain breads, low-fat dairy products, beans, lean meats, and fish. Good nutrition can help manage fatigue.      Limit caffeine and alcohol. These can make it difficult to fall or stay asleep. Women should limit alcohol to 1 drink a day. Men should limit alcohol to 2 drinks a day. A drink of alcohol is 12 ounces of beer, 5 ounces of wine, or 1½ ounces of liquor. Ask our healthcare provider how much caffeine is safe for you.      Do not smoke. Nicotine and other chemicals in cigarettes and cigars can cause lung damage and increase fatigue. Ask your healthcare provider for information if you currently smoke and need help to quit. E-cigarettes or smokeless tobacco still contain nicotine. Talk to your healthcare provider before you use these products.          © Copyright [employer] 2019 All illustrations and images included in CareNotes are the copyrighted property of [employer] Inc[employer] or [employer].

## 2023-01-09 NOTE — ED PROVIDER NOTE - PATIENT PORTAL LINK FT
You can access the FollowMyHealth Patient Portal offered by [facility] by registering at the following website: [email or web address]. By joining [employer]s FollowMyHealth portal, you will also be able to view your health information using other applications (apps) compatible with our system.

## 2023-01-09 NOTE — ED ADULT NURSE NOTE - OBJECTIVE STATEMENT
Pt states he had a bad reaction to Quetiapine, states he felt like he was going to pass out, couldn't get up.

## 2023-01-09 NOTE — ED PROVIDER NOTE - OBJECTIVE STATEMENT
22-year-old male with no past medical history depression presents with complaint of generalized weakness. Patient states he took one 100 mg Seroquel at 1 PM. States medication kicked in at 5:30PM, which is when he began feeling weak. States he stood up and felt dizzy, then fell asleep. His mother called EMS, they arrived in about 15 minutes and found patient somnolent. Pt states he didn't have anything to eat or drink today due to decreased appetite. Denies MJ or other drug use today, SI/HI, f/c, SOB, CP, abd pain, N/V/D.

## 2023-01-10 VITALS
SYSTOLIC BLOOD PRESSURE: 109 MMHG | RESPIRATION RATE: 16 BRPM | OXYGEN SATURATION: 97 % | TEMPERATURE: 96 F | HEART RATE: 60 BPM | DIASTOLIC BLOOD PRESSURE: 63 MMHG

## 2023-01-11 ENCOUNTER — OUTPATIENT (OUTPATIENT)
Dept: OUTPATIENT SERVICES | Facility: HOSPITAL | Age: 23
LOS: 1 days | Discharge: HOME | End: 2023-01-11

## 2023-01-11 ENCOUNTER — APPOINTMENT (OUTPATIENT)
Dept: PSYCHIATRY | Facility: CLINIC | Age: 23
End: 2023-01-11
Payer: MEDICAID

## 2023-01-11 DIAGNOSIS — F32.A DEPRESSION, UNSPECIFIED: ICD-10-CM

## 2023-01-11 DIAGNOSIS — F41.9 ANXIETY DISORDER, UNSPECIFIED: ICD-10-CM

## 2023-01-11 DIAGNOSIS — R53.1 WEAKNESS: ICD-10-CM

## 2023-01-11 DIAGNOSIS — F17.290 NICOTINE DEPENDENCE, OTHER TOBACCO PRODUCT, UNCOMPLICATED: ICD-10-CM

## 2023-01-11 DIAGNOSIS — F34.0 CYCLOTHYMIC DISORDER: ICD-10-CM

## 2023-01-11 DIAGNOSIS — F41.1 GENERALIZED ANXIETY DISORDER: ICD-10-CM

## 2023-01-11 DIAGNOSIS — G47.00 INSOMNIA, UNSPECIFIED: ICD-10-CM

## 2023-01-11 DIAGNOSIS — R55 SYNCOPE AND COLLAPSE: ICD-10-CM

## 2023-01-11 PROCEDURE — 99214 OFFICE O/P EST MOD 30 MIN: CPT | Mod: 95

## 2023-01-11 RX ORDER — QUETIAPINE FUMARATE 100 MG/1
100 TABLET ORAL DAILY
Qty: 30 | Refills: 1 | Status: DISCONTINUED | COMMUNITY
Start: 2022-11-22 | End: 2023-01-11

## 2023-01-11 RX ORDER — BUSPIRONE HYDROCHLORIDE 10 MG/1
10 TABLET ORAL TWICE DAILY
Qty: 60 | Refills: 1 | Status: DISCONTINUED | COMMUNITY
Start: 2022-12-14 | End: 2023-01-11

## 2023-01-11 RX ORDER — QUETIAPINE FUMARATE 400 MG/1
400 TABLET ORAL
Qty: 30 | Refills: 2 | Status: DISCONTINUED | COMMUNITY
Start: 2022-08-24 | End: 2023-01-11

## 2023-01-11 NOTE — PAST MEDICAL HISTORY
[FreeTextEntry1] : 20 y/o single M referred by ED following ED visits 6/2/22 and 6/13/22 for worsening anxiety and prescribed hydroxyzine with improvement (“more calm with it”), regular marijuana user, PMH hearing impaired R ear, no reported PPH, saw therapist “for a few sessions” at unknown organization at 17 y/o but discontinued treatment due to “I didn’t feel comfortable there,” has never before seen psych, currently employed full time at [employer] 1 year, currently domiciled with mother, stepfather (who raised him from birth), and 3 ½  sisters (17 y/o F, 10 y/o F, 7 y/o F) in private apartment (limited relationship with biological father). As per [employer], Mickey originally contacted [facility] 12/2021 and endorsed substance use at that time and was referred to [facility] but Mickey did not follow-up. When asked, Mickey states he last used unprescribed Xanax, Percocet, and acid recreationally 2 years ago, denies any current use (notes while on acid saw colors/lights that has continued and worried about permanency, encouraged to follow up with eye doctor?). Mickey reports “extreme anxiety and anger issues,” describing anxiety as excessive worry, worsening when in social situations, “I overthink a lot and the more I overthink I end up in a bad place in my mind and it’s difficult to be around people,” resulting in self-isolation. Panic episodes once per week described as difficulty breathing, racing thoughts, shakiness, and “felt like I couldn’t walk,” most recent episode 6/28/22. Mickey reports current mild depressed mood that has improved over the last 2 weeks, decreased appetite, and difficulty sleeping (works night shifts). Mickey notes he broke up in his girlfriend 5/2022 and at that time experienced worsening decreased appetite, 20 lbs unintentional weight loss, excessive crying, loss of motivation “difficulty going to work for 2 weeks,” and hopelessness that has since improved due to getting back together recently. (+) recent passive SI “I had thoughts of wanting to hurt myself, I wish I didn’t wake up,” “it’s when I feel like my life isn’t going anywhere,” none currently, no intent or plan, no hx of SA. Mickey reports episodes of difficulty with emotional regulation “when I don’t know how to handle a situation,” specifically high stress situations, described as “irritability, explosiveness, and frustration” resulting in physical aggression towards property, no HI. In 2021 he injured his hand by punching a piece of metal. No IPP admissions.

## 2023-01-11 NOTE — DISCUSSION/SUMMARY
[FreeTextEntry1] : Patient has stable mood, improved mood, less anxiety.\par Continue medication:\par 1. gabapentin 400mg po TID\par 2. Seroquel 110mg in am and 400mg at bedtime for mood/anxiety, d/c\par 3. d/c buspar\par 4. start abilify 5mg po daily\par \par Follow up in 1 week

## 2023-01-11 NOTE — REASON FOR VISIT
[Patient preference] : as per patient preference [Telehealth (audio & video) - Individual/Group] : This visit was provided via telehealth using real-time 2-way audio visual technology. [Medical Office: ([facility])___] : The provider was located at the medical office in [unfilled]. [Home] : The patient, [unfilled], was located at home, [unfilled], at the time of the visit. [Verbal consent obtained from patient/other participant(s)] : Verbal consent for telehealth/telephonic services obtained from patient/other participant(s) [Patient] : Patient [FreeTextEntry1] : "I stopped seroquel, I turned pale and almost passed out."

## 2023-01-11 NOTE — HISTORY OF PRESENT ILLNESS
[FreeTextEntry1] : This is a 22 year old patient who presents for medication management.  The patient reports some depressed mood, sleep and poor appetite.  The patient denies depression, horace, denies psychosis at this time.  The patient has some ongoing  anxiety that impairs their daily functioning. The patient denies any suicidal ideation, homicidal ideation, no auditory or visual hallucinations, or paranoid ideation.  The patient has no medical complaints. The patient denies any alcohol use, and is not smoking at this time.  He will try to reduce and stop cannabis use. I received the patient's updated physical and labs from their PCP.  Coping skills were discussed and a safety plan was provided.  The patient was educated on the risks, benefits, and alternatives of their psychiatric medications.  The patient will engage in psychotherapy at this time.  The patient consents to ongoing medication management.\par \par Adjust medications.  Will d/c seroqeul per request, had syncopal episode or hypotension, risks, benefits discussed, start abilify. [No] : no [TextBox_32] : +) recent passive SI “I had thoughts of wanting to hurt myself, I wish I didn’t wake up,” “it’s when I feel like my life isn’t going anywhere,” none current, no intent or plan, no hx of SA. [de-identified] : \par  [None] : none [Responsibility to family or others] : responsibility to family or others [Identifies reasons for living] : identifies reasons for living [Future oriented] : future oriented [Engaged in work or school] : engaged in work or school [TextBox_52] : +) recent passive SI “I had thoughts of wanting to hurt myself, I wish I didn’t wake up,” “it’s when I feel like my life isn’t going anywhere,” none current, no intent or plan, no hx of SA. [None Known] : none known [Yes] : yes [Irritability] : irritability [Residential stability] : residential stability [Relationship stability] : relationship stability [Employment stability] : employment stability [Affective stability] : affective stability

## 2023-01-11 NOTE — CURRENT PSYCHIATRIC SYMPTOMS
[Depressed Mood] : depressed mood [Anhedonia] : no anhedonia [Guilt] : not feeling guilty [Decreased Concentration] : decreased concentrating ability [Hyperphagia] : no hyperphagia [Insomnia] : insomnia [Hypersomnia] : no ~T hypersomnia [Psychomotor Retardation] : no psychomotor retardation [Anorexia] : anorexia [Euphoria] : no euphoria [Highly Irritable] : high irritability [Increased Activity] : no increased in activity [Distractibility] : distractibility [Talkativeness] : no talkativeness [Grandeur] : no feelings of grandeur [Buying Sprees] : no buying sprees [Hypersexuality] : denied hypersexuality [Dec Need For Sleep] : no decreased need for sleep [Delusions] : no ~T delusions [Hallucination Visual] : no visual hallucinations [Hallucination Auditory] : no auditory hallucinations [Hallucination Tactile] : no tactile hallucinations [Thought Disorder] : ~T a thought disorder was not noted [Excessive Worry] : excessive worry [Ruminations] : no rumination disorder [Obsessions] : no obsessions [Re-experiencing] : no re-experiencing [Restlessness] : no restlessness [Hypochondriasis] : no hypochondriasis [Panic] : panic  [de-identified] : (+) decreased appetite [de-identified] : irritability possibly related to anxiety?, impulsivity with spending

## 2023-01-11 NOTE — PLAN
[FreeTextEntry4] : mood is little down\par \par anxiety is little more due to work stress\par \par health is good, other than recent adverse reaction to seroquel\par \par working and going to school

## 2023-01-11 NOTE — SOCIAL HISTORY
[With Family] : lives with family [Employed] : employed [Never ] : never  [High School] : high school [None] : none [FreeTextEntry2] : full time at [employer] 1 year [Yes] : yes [TextBox_7] : social drinker, on special occasions [FreeTextEntry1] : regular marijuana use [FreeTextEntry9] : unprescribed Percocet recreationally, last use 2020 [TextBox_52] : unprescribed Xanax recreationally, last use 2020 [TextBox_62] : acid, last use 2020

## 2023-01-17 ENCOUNTER — RESULT CHARGE (OUTPATIENT)
Age: 23
End: 2023-01-17

## 2023-01-17 ENCOUNTER — APPOINTMENT (OUTPATIENT)
Dept: CARDIOLOGY | Facility: CLINIC | Age: 23
End: 2023-01-17
Payer: MEDICAID

## 2023-01-17 VITALS
BODY MASS INDEX: 20.92 KG/M2 | DIASTOLIC BLOOD PRESSURE: 72 MMHG | HEART RATE: 98 BPM | SYSTOLIC BLOOD PRESSURE: 118 MMHG | HEIGHT: 68 IN | WEIGHT: 138 LBS

## 2023-01-17 DIAGNOSIS — F17.200 NICOTINE DEPENDENCE, UNSPECIFIED, UNCOMPLICATED: ICD-10-CM

## 2023-01-17 DIAGNOSIS — Z82.49 FAMILY HISTORY OF ISCHEMIC HEART DISEASE AND OTHER DISEASES OF THE CIRCULATORY SYSTEM: ICD-10-CM

## 2023-01-17 DIAGNOSIS — Z13.6 ENCOUNTER FOR SCREENING FOR CARDIOVASCULAR DISORDERS: ICD-10-CM

## 2023-01-17 DIAGNOSIS — Z86.59 PERSONAL HISTORY OF OTHER MENTAL AND BEHAVIORAL DISORDERS: ICD-10-CM

## 2023-01-17 DIAGNOSIS — F41.9 ANXIETY DISORDER, UNSPECIFIED: ICD-10-CM

## 2023-01-17 PROCEDURE — 93000 ELECTROCARDIOGRAM COMPLETE: CPT

## 2023-01-17 PROCEDURE — 99204 OFFICE O/P NEW MOD 45 MIN: CPT | Mod: 25

## 2023-01-17 NOTE — HISTORY OF PRESENT ILLNESS
[FreeTextEntry1] : Pt is 22 year old male with PMH of Bipolar depression, anxiety, insomnia.  S/p ER visit at [facility] due to severe fatigue, pre syncopal episodes.  As per pt he took Gabapentin 100 mg Seroquel at 1 PM and few hours later \par he began feeling weak.   Pt stated that he did not eat or drink the whole day prior to the episode.   Pt denies chest pain, no palpitations, no SOB.  Pt stated that he had chest pressure in the past not associated with activity.  Pt is very active.  Works at [employer] and does physical work without Angina.  Pt smokes marihuana all day and vapes.  \par  \par

## 2023-01-17 NOTE — ASSESSMENT
[FreeTextEntry1] : 23 y/o male, presenting for cardiac evaluation after a near syncope episode.\par No new events.\par \par ER records reviewed.\par ECG - Early repolarization, no acute changes\par \par Labs noted.\par \par Quit or minimize substance use, marijuana use, vaping, etc.\par F/u with psychiatry\par Adequate hydration discussed with him and mother\par \par Obtain 2D echo to r/o structural heart disease.\par If no further episodes, and normal echo, f/u PRN.\par If recurrent syncope, refer to EP for ILR.\par

## 2023-01-18 ENCOUNTER — APPOINTMENT (OUTPATIENT)
Dept: PSYCHIATRY | Facility: CLINIC | Age: 23
End: 2023-01-18
Payer: MEDICAID

## 2023-01-18 ENCOUNTER — OUTPATIENT (OUTPATIENT)
Dept: OUTPATIENT SERVICES | Facility: HOSPITAL | Age: 23
LOS: 1 days | Discharge: HOME | End: 2023-01-18

## 2023-01-18 DIAGNOSIS — G47.00 INSOMNIA, UNSPECIFIED: ICD-10-CM

## 2023-01-18 DIAGNOSIS — F41.1 GENERALIZED ANXIETY DISORDER: ICD-10-CM

## 2023-01-18 DIAGNOSIS — F34.0 CYCLOTHYMIC DISORDER: ICD-10-CM

## 2023-01-18 PROCEDURE — 99214 OFFICE O/P EST MOD 30 MIN: CPT | Mod: 95

## 2023-01-18 RX ORDER — BUSPIRONE HYDROCHLORIDE 10 MG/1
10 TABLET ORAL TWICE DAILY
Refills: 0 | Status: DISCONTINUED | COMMUNITY

## 2023-01-18 NOTE — DISCUSSION/SUMMARY
[FreeTextEntry1] : Patient has stable mood, improved mood, less anxiety.\par Continue medication:\par 1. gabapentin 400mg po TID\par 2. abilify 5mg po daily\par \par Follow up in 1 month

## 2023-01-18 NOTE — REASON FOR VISIT
[Patient preference] : as per patient preference [Telehealth (audio & video) - Individual/Group] : This visit was provided via telehealth using real-time 2-way audio visual technology. [Medical Office: ([facility])___] : The provider was located at the medical office in [unfilled]. [Home] : The patient, [unfilled], was located at home, [unfilled], at the time of the visit. [Verbal consent obtained from patient/other participant(s)] : Verbal consent for telehealth/telephonic services obtained from patient/other participant(s) [Patient] : Patient [FreeTextEntry1] : "I am better, and now working part time"

## 2023-01-18 NOTE — SOCIAL HISTORY
[With Family] : lives with family [Employed] : employed [Never ] : never  [High School] : high school [None] : none [FreeTextEntry2] : full time at [employer] 1 year [Yes] : yes [TextBox_7] : social drinker, on special occasions [FreeTextEntry1] : regular marijuana use [FreeTextEntry9] : unprescribed Percocet recreationally, last use 2020 [TextBox_52] : unprescribed Xanax recreationally, last use 2020 [TextBox_62] : acid, last use 2020

## 2023-01-18 NOTE — PAST MEDICAL HISTORY
[FreeTextEntry1] : 22 y/o single M referred by ED following ED visits 6/2/22 and 6/13/22 for worsening anxiety and prescribed hydroxyzine with improvement (“more calm with it”), regular marijuana user, PMH hearing impaired R ear, no reported PPH, saw therapist “for a few sessions” at unknown organization at 17 y/o but discontinued treatment due to “I didn’t feel comfortable there,” has never before seen psych, currently employed full time at [employer] 1 year, currently domiciled with mother, stepfather (who raised him from birth), and 3 ½  sisters (17 y/o F, 10 y/o F, 9 y/o F) in private apartment (limited relationship with biological father). As per [employer], Mickey originally contacted [facility] OPD 12/2021 and endorsed substance use at that time and was referred to [facility] but Mickey did not follow-up. When asked, Mickey states he last used unprescribed Xanax, Percocet, and acid recreationally 2 years ago, denies any current use (notes while on acid saw colors/lights that has continued and worried about permanency, encouraged to follow up with eye doctor?). Mickey reports “extreme anxiety and anger issues,” describing anxiety as excessive worry, worsening when in social situations, “I overthink a lot and the more I overthink I end up in a bad place in my mind and it’s difficult to be around people,” resulting in self-isolation. Panic episodes once per week described as difficulty breathing, racing thoughts, shakiness, and “felt like I couldn’t walk,” most recent episode 6/28/22. Mickey reports current mild depressed mood that has improved over the last 2 weeks, decreased appetite, and difficulty sleeping (works night shifts). Mickey notes he broke up in his girlfriend 5/2022 and at that time experienced worsening decreased appetite, 20 lbs unintentional weight loss, excessive crying, loss of motivation “difficulty going to work for 2 weeks,” and hopelessness that has since improved due to getting back together recently. (+) recent passive SI “I had thoughts of wanting to hurt myself, I wish I didn’t wake up,” “it’s when I feel like my life isn’t going anywhere,” none currently, no intent or plan, no hx of SA. Mickey reports episodes of difficulty with emotional regulation “when I don’t know how to handle a situation,” specifically high stress situations, described as “irritability, explosiveness, and frustration” resulting in physical aggression towards property, no HI. In 2021 he injured his hand by punching a piece of metal. No IPP admissions.

## 2023-01-18 NOTE — CURRENT PSYCHIATRIC SYMPTOMS
[Depressed Mood] : depressed mood [Anhedonia] : no anhedonia [Guilt] : not feeling guilty [Decreased Concentration] : decreased concentrating ability [Hyperphagia] : no hyperphagia [Insomnia] : insomnia [Hypersomnia] : no ~T hypersomnia [Psychomotor Retardation] : no psychomotor retardation [Anorexia] : anorexia [Euphoria] : no euphoria [Highly Irritable] : high irritability [Increased Activity] : no increased in activity [Distractibility] : distractibility [Talkativeness] : no talkativeness [Grandeur] : no feelings of grandeur [Buying Sprees] : no buying sprees [Hypersexuality] : denied hypersexuality [Dec Need For Sleep] : no decreased need for sleep [Delusions] : no ~T delusions [Hallucination Visual] : no visual hallucinations [Hallucination Auditory] : no auditory hallucinations [Hallucination Tactile] : no tactile hallucinations [Thought Disorder] : ~T a thought disorder was not noted [Excessive Worry] : excessive worry [Ruminations] : no rumination disorder [Obsessions] : no obsessions [Re-experiencing] : no re-experiencing [Restlessness] : no restlessness [Hypochondriasis] : no hypochondriasis [Panic] : panic  [de-identified] : (+) decreased appetite [de-identified] : irritability possibly related to anxiety?, impulsivity with spending

## 2023-01-18 NOTE — PLAN
[FreeTextEntry4] : mood is improved\par \par anxiety is less\par \par health is good, other than recent adverse reaction to seroquel\par \par working and going to school

## 2023-01-20 ENCOUNTER — NON-APPOINTMENT (OUTPATIENT)
Age: 23
End: 2023-01-20

## 2023-01-20 ENCOUNTER — APPOINTMENT (OUTPATIENT)
Dept: PSYCHIATRY | Facility: CLINIC | Age: 23
End: 2023-01-20

## 2023-01-24 ENCOUNTER — APPOINTMENT (OUTPATIENT)
Dept: CARDIOLOGY | Facility: CLINIC | Age: 23
End: 2023-01-24

## 2023-02-03 ENCOUNTER — APPOINTMENT (OUTPATIENT)
Dept: PSYCHIATRY | Facility: CLINIC | Age: 23
End: 2023-02-03

## 2023-02-03 ENCOUNTER — OUTPATIENT (OUTPATIENT)
Dept: OUTPATIENT SERVICES | Facility: HOSPITAL | Age: 23
LOS: 1 days | Discharge: HOME | End: 2023-02-03

## 2023-02-03 DIAGNOSIS — F41.1 GENERALIZED ANXIETY DISORDER: ICD-10-CM

## 2023-02-03 DIAGNOSIS — G47.00 INSOMNIA, UNSPECIFIED: ICD-10-CM

## 2023-02-03 DIAGNOSIS — F34.0 CYCLOTHYMIC DISORDER: ICD-10-CM

## 2023-02-03 NOTE — PLAN
[[facility]] : [facility]  [Supportive Therapy] : Supportive Therapy [FreeTextEntry2] : Treatment Planning: \par Goal #1 " I want to manage my emotions and to have a healthy relationship" \par \par Obj#1 Patient will express feelings and at least 2 sources of mood disturbance. \par Obj#2 Patient will learn and utilize at least 2 positive coping, conflict resolution, or CBT methods, \par \par Goal #2 " I don't want to feel calm and not over think" \par \par Obj#1 Patient will identify at least 2 trigger of anxiety \par Obj#2 Patient will learn at least 2 relaxation methods other than use of substances. \par  [de-identified] : Patient reports improvement with his mood and the way he has been managing loss of his relationship, although he still gets palpitations if he sees her when he is out and cries or feels like crying at times when he thinks about her or every talks about her. He reports that he has accepted the break up with he ex girlfriend, he feels a sense of freedom, and is appreciating what is around him such as the fresh air. He reports that he has always had a problem being too sensitive.He reports that he is no longer taking medication to help him sleep,which was discontinued by his psychiatrist, but is still taking his other medication to help his mood and anxiety. He reports that he has been sleeping better since his work hours have changed. He reports also having a sensitive stomach . He reports that he has been having problems eating and has lost some weight. He reports that he has had stomach issues in the past and had some testing but does not remember his diagnosis. Encouraged him to discuss further with his medical doctor. Spoke about positive coping and relaxation methods such practicing deep breathing and mindfulness. He reports that reading is a positive outlet for him. He reports that he also uses marijuana at times to relax.  [Recommended Frequency of Visits: ____] : Recommended frequency of visits: [unfilled] [Return in ____ week(s)] : Return in [unfilled] week(s) [FreeTextEntry1] : Patient will focus on positive coping, relaxation, and CBT methods. He will continue medication regimen, followup with PCP, and followup with therapy on 2/15.

## 2023-02-15 ENCOUNTER — OUTPATIENT (OUTPATIENT)
Dept: OUTPATIENT SERVICES | Facility: HOSPITAL | Age: 23
LOS: 1 days | End: 2023-02-15
Payer: MEDICAID

## 2023-02-15 ENCOUNTER — APPOINTMENT (OUTPATIENT)
Dept: PSYCHIATRY | Facility: CLINIC | Age: 23
End: 2023-02-15

## 2023-02-15 ENCOUNTER — APPOINTMENT (OUTPATIENT)
Dept: PSYCHIATRY | Facility: CLINIC | Age: 23
End: 2023-02-15
Payer: MEDICAID

## 2023-02-15 PROCEDURE — 99214 OFFICE O/P EST MOD 30 MIN: CPT | Mod: 25,95

## 2023-02-15 PROCEDURE — 99214 OFFICE O/P EST MOD 30 MIN: CPT | Mod: 95

## 2023-02-15 PROCEDURE — 90832 PSYTX W PT 30 MINUTES: CPT

## 2023-02-16 NOTE — PLAN
[FreeTextEntry2] : Treatment Planning: \par Goal #1 " I want to manage my emotions and to have a healthy relationship" \par \par Obj#1 Patient will express feelings and at least 2 sources of mood disturbance. \par Obj#2 Patient will learn and utilize at least 2 positive coping, conflict resolution, or CBT methods, \par \par Goal #2 " I don't want to feel calm and not over think" \par \par Obj#1 Patient will identify at least 2 trigger of anxiety \par Obj#2 Patient will learn at least 2 relaxation methods other than use of substances. \par  [[facility]] : [facility]  [Supportive Therapy] : [facility] Therapy [de-identified] : Patient reports that his mood has been better. He report that he is enjoying his job and since he is working the early shift, he is sleeping better. He reports seeing psychiatrist today whom ,he says, added a new medication,Mirtazapine 75 mg, to help with his sleep and appetite. He reports that he is feeling more motivated to get out since he is looking forward to getting a skate board today. He was eager for session to be over since his cousin is waiting for him to go to the Mall and buy his skate board. He reports that he is thinking less about his ex girlfriend. He reports that his anxiety is worse when he sees her or thinks about her. He reports use of positive coping methods such as reading to help distract his anxiety thoughts. Addressed use of marijuana oil and nicotine vape daily. He reports that he is not ready to stop using these substances especially the marijuana oil. . Addressed possible health risks.  [Recommended Frequency of Visits: ____] : Recommended frequency of visits: [unfilled] [Return in ____ week(s)] : Return in [unfilled] week(s) [FreeTextEntry1] : Patient will focus on positive coping, relaxation, stress management, and CBT methods. He will continue medication regimen and followup with therapy on 3/1.

## 2023-02-16 NOTE — REASON FOR VISIT
[Patient] : Patient [FreeTextEntry1] : Patient is a 22 year old male being seen for a followup therapy session. COVID screening is negative.

## 2023-02-24 DIAGNOSIS — F41.1 GENERALIZED ANXIETY DISORDER: ICD-10-CM

## 2023-02-24 DIAGNOSIS — F34.0 CYCLOTHYMIC DISORDER: ICD-10-CM

## 2023-02-24 DIAGNOSIS — G47.00 INSOMNIA, UNSPECIFIED: ICD-10-CM

## 2023-02-28 NOTE — HISTORY OF PRESENT ILLNESS
[No] : no [None] : none [Responsibility to family or others] : responsibility to family or others [Identifies reasons for living] : identifies reasons for living [Future oriented] : future oriented [Engaged in work or school] : engaged in work or school [None Known] : none known [Yes] : yes [Irritability] : irritability [Residential stability] : residential stability [Relationship stability] : relationship stability [Employment stability] : employment stability [Affective stability] : affective stability [FreeTextEntry1] : This is a 22 year old patient who presents for medication management.  The patient reports improved mood, sleep, but poor appetite.  The patient denies depression, horace, denies psychosis at this time.  The patient has some ongoing  anxiety that impairs their daily functioning. The patient denies any suicidal ideation, homicidal ideation, no auditory or visual hallucinations, or paranoid ideation.  The patient has no medical complaints. The patient denies any alcohol use, and is not smoking at this time.  He will try to reduce and stop cannabis use. I received the patient's updated physical and labs from their PCP.  Coping skills were discussed and a safety plan was provided.  The patient was educated on the risks, benefits, and alternatives of their psychiatric medications.  The patient will engage in psychotherapy at this time.  The patient consents to ongoing medication management.\par \par Adjust medications.\par \par 2/28:patient called and stated he had a bad panic attack and was crying, risks/benefits discussed, will raise remeron to 15mg at bedtime, patient will call in a few days as needed, and will be seen in 2 weeks, earlier if needed [TextBox_32] : +) recent passive SI “I had thoughts of wanting to hurt myself, I wish I didn’t wake up,” “it’s when I feel like my life isn’t going anywhere,” none current, no intent or plan, no hx of SA. [de-identified] : \par  [TextBox_52] : +) recent passive SI “I had thoughts of wanting to hurt myself, I wish I didn’t wake up,” “it’s when I feel like my life isn’t going anywhere,” none current, no intent or plan, no hx of SA.

## 2023-02-28 NOTE — CURRENT PSYCHIATRIC SYMPTOMS
[Depressed Mood] : depressed mood [Decreased Concentration] : decreased concentrating ability [Insomnia] : insomnia [Anorexia] : anorexia [Highly Irritable] : high irritability [Distractibility] : distractibility [Excessive Worry] : excessive worry [Panic] : panic  [Anhedonia] : no anhedonia [Guilt] : not feeling guilty [Hyperphagia] : no hyperphagia [Hypersomnia] : no ~T hypersomnia [Psychomotor Retardation] : no psychomotor retardation [Euphoria] : no euphoria [Increased Activity] : no increased in activity [Talkativeness] : no talkativeness [Grandeur] : no feelings of grandeur [Buying Sprees] : no buying sprees [Hypersexuality] : denied hypersexuality [Dec Need For Sleep] : no decreased need for sleep [Delusions] : no ~T delusions [Hallucination Visual] : no visual hallucinations [Hallucination Auditory] : no auditory hallucinations [Hallucination Tactile] : no tactile hallucinations [Thought Disorder] : ~T a thought disorder was not noted [Ruminations] : no rumination disorder [Obsessions] : no obsessions [Re-experiencing] : no re-experiencing [Restlessness] : no restlessness [Hypochondriasis] : no hypochondriasis [de-identified] : (+) decreased appetite [de-identified] : irritability possibly related to anxiety?, impulsivity with spending

## 2023-02-28 NOTE — DISCUSSION/SUMMARY
[FreeTextEntry1] : Patient has stable mood, improved mood, less anxiety, but poor appetite\par Continue medication:\par 1. gabapentin 400mg po TID\par 2. abilify 5mg po daily\par 3. add mirtazapine 7.5mg at bedtime\par \par Follow up in 1 month, earlier as needed

## 2023-02-28 NOTE — PAST MEDICAL HISTORY
[FreeTextEntry1] : 22 y/o single M referred by ED following ED visits 6/2/22 and 6/13/22 for worsening anxiety and prescribed hydroxyzine with improvement (“more calm with it”), regular marijuana user, PMH hearing impaired R ear, no reported PPH, saw therapist “for a few sessions” at unknown organization at 19 y/o but discontinued treatment due to “I didn’t feel comfortable there,” has never before seen psych, currently employed full time at [employer] 1 year, currently domiciled with mother, stepfather (who raised him from birth), and 3 ½  sisters (15 y/o F, 10 y/o F, 7 y/o F) in private apartment (limited relationship with biological father). As per [employer], Mickey originally contacted [facility] OPD 12/2021 and endorsed substance use at that time and was referred to [facility] but Mickey did not follow-up. When asked, Mickey states he last used unprescribed Xanax, Percocet, and acid recreationally 2 years ago, denies any current use (notes while on acid saw colors/lights that has continued and worried about permanency, encouraged to follow up with eye doctor?). Mickey reports “extreme anxiety and anger issues,” describing anxiety as excessive worry, worsening when in social situations, “I overthink a lot and the more I overthink I end up in a bad place in my mind and it’s difficult to be around people,” resulting in self-isolation. Panic episodes once per week described as difficulty breathing, racing thoughts, shakiness, and “felt like I couldn’t walk,” most recent episode 6/28/22. Mickey reports current mild depressed mood that has improved over the last 2 weeks, decreased appetite, and difficulty sleeping (works night shifts). Mickey notes he broke up in his girlfriend 5/2022 and at that time experienced worsening decreased appetite, 20 lbs unintentional weight loss, excessive crying, loss of motivation “difficulty going to work for 2 weeks,” and hopelessness that has since improved due to getting back together recently. (+) recent passive SI “I had thoughts of wanting to hurt myself, I wish I didn’t wake up,” “it’s when I feel like my life isn’t going anywhere,” none currently, no intent or plan, no hx of SA. Mickey reports episodes of difficulty with emotional regulation “when I don’t know how to handle a situation,” specifically high stress situations, described as “irritability, explosiveness, and frustration” resulting in physical aggression towards property, no HI. In 2021 he injured his hand by punching a piece of metal. No IPP admissions.

## 2023-02-28 NOTE — REASON FOR VISIT
[Patient preference] : as per patient preference [Telehealth (audio & video) - Individual/Group] : This visit was provided via telehealth using real-time 2-way audio visual technology. [Medical Office: ([facility])___] : The provider was located at the medical office in [unfilled]. [Home] : The patient, [unfilled], was located at home, [unfilled], at the time of the visit. [Verbal consent obtained from patient/other participant(s)] : Verbal consent for telehealth/telephonic services obtained from patient/other participant(s) [Patient] : Patient [FreeTextEntry1] : "I am better, but have poor appetite."

## 2023-02-28 NOTE — SOCIAL HISTORY
[With Family] : lives with family [Employed] : employed [Never ] : never  [High School] : high school [None] : none [Yes] : yes [FreeTextEntry2] : full time at [employer] 1 year [TextBox_7] : social drinker, on special occasions [FreeTextEntry1] : regular marijuana use [FreeTextEntry9] : unprescribed Percocet recreationally, last use 2020 [TextBox_52] : unprescribed Xanax recreationally, last use 2020 [TextBox_62] : acid, last use 2020

## 2023-02-28 NOTE — FAMILY HISTORY
[FreeTextEntry1] : Family composition: never , no children\par Family history and background: raised by mother and stepfather (stepdad since birth), mother and biological father  before his birth, limited relationship with biological father "we talk rarely," 3 ½  sisters (15 y/o F, 10 y/o F, 7 y/o F) \par Family relationship: supportive/stressful with mother and stepfather, supportive relationships with 1/s sisters "I love them, they're just sister, we're good" \par Pertinent Family Medical, MH and Substance Use History including Adult Child of Alcoholic and child of substance abuse status; history of cancer and heart disease\par *feels like mental health hx in family but undiagnosed\par Step-father - alcohol abuse, not completely sober but has decreased alcohol use recently\par

## 2023-02-28 NOTE — PLAN
[FreeTextEntry4] : mood is improved\par \par anxiety is less\par \par health is good, other than recent adverse reaction to seroquel, but poor appetite\par \par working and going to school

## 2023-03-01 ENCOUNTER — APPOINTMENT (OUTPATIENT)
Dept: PSYCHIATRY | Facility: CLINIC | Age: 23
End: 2023-03-01

## 2023-03-01 ENCOUNTER — NON-APPOINTMENT (OUTPATIENT)
Age: 23
End: 2023-03-01

## 2023-03-06 ENCOUNTER — APPOINTMENT (OUTPATIENT)
Dept: CARDIOLOGY | Facility: CLINIC | Age: 23
End: 2023-03-06
Payer: MEDICAID

## 2023-03-06 DIAGNOSIS — R55 SYNCOPE AND COLLAPSE: ICD-10-CM

## 2023-03-06 PROCEDURE — 93306 TTE W/DOPPLER COMPLETE: CPT

## 2023-03-09 NOTE — ED PROVIDER NOTE - OBJECTIVE STATEMENT
Spe[facility] Language Therapy Discharge Summary    Reason for therapy discharge:    All goals and outcomes met, no further needs identified.    Progress towards therapy goal(s). See goals on Care Plan in [facility] electronic health record for goal details.  Goals met    Therapy recommendation(s):    No further therapy is recommended. Recommend regular diet and thin liquids w/ distant supervision. Patient should be fully upright and in chair when possible and alert, remain upright at least 30 minutes after intake, take small bites/sips, and use a slow rate of intake.       22 yo m sts having anxiety and ran out of his atarax, requesting a refill. is in process of establishing OP Mental health. denies si hi. missed work today and needs a work excuse note. no somatic complaints.

## 2023-03-13 ENCOUNTER — OUTPATIENT (OUTPATIENT)
Dept: OUTPATIENT SERVICES | Facility: HOSPITAL | Age: 23
LOS: 1 days | End: 2023-03-13
Payer: MEDICAID

## 2023-03-13 ENCOUNTER — APPOINTMENT (OUTPATIENT)
Dept: PSYCHIATRY | Facility: CLINIC | Age: 23
End: 2023-03-13
Payer: MEDICAID

## 2023-03-13 DIAGNOSIS — F34.0 CYCLOTHYMIC DISORDER: ICD-10-CM

## 2023-03-13 DIAGNOSIS — F41.1 GENERALIZED ANXIETY DISORDER: ICD-10-CM

## 2023-03-13 DIAGNOSIS — G47.00 INSOMNIA, UNSPECIFIED: ICD-10-CM

## 2023-03-13 PROCEDURE — 99214 OFFICE O/P EST MOD 30 MIN: CPT | Mod: 95

## 2023-03-13 PROCEDURE — 99214 OFFICE O/P EST MOD 30 MIN: CPT

## 2023-03-13 RX ORDER — MIRTAZAPINE 7.5 MG/1
7.5 TABLET, FILM COATED ORAL
Qty: 30 | Refills: 0 | Status: DISCONTINUED | COMMUNITY
Start: 2023-02-15

## 2023-03-13 NOTE — PLAN
[FreeTextEntry4] : mood is depressed\par \par anxiety is increased\par \par health is good, other than recent adverse reaction to seroquel, but poor appetite\par \par working and going to school\par \par continues therapy

## 2023-03-13 NOTE — PAST MEDICAL HISTORY
[FreeTextEntry1] : 20 y/o single M referred by ED following ED visits 6/2/22 and 6/13/22 for worsening anxiety and prescribed hydroxyzine with improvement (“more calm with it”), regular marijuana user, PMH hearing impaired R ear, no reported PPH, saw therapist “for a few sessions” at unknown organization at 17 y/o but discontinued treatment due to “I didn’t feel comfortable there,” has never before seen psych, currently employed full time at [employer] 1 year, currently domiciled with mother, stepfather (who raised him from birth), and 3 ½  sisters (15 y/o F, 10 y/o F, 7 y/o F) in private apartment (limited relationship with biological father). As per [employer], Mickey originally contacted [facility] OPD 12/2021 and endorsed substance use at that time and was referred to [facility] but Mickey did not follow-up. When asked, Mickey states he last used unprescribed Xanax, Percocet, and acid recreationally 2 years ago, denies any current use (notes while on acid saw colors/lights that has continued and worried about permanency, encouraged to follow up with eye doctor?). Mickey reports “extreme anxiety and anger issues,” describing anxiety as excessive worry, worsening when in social situations, “I overthink a lot and the more I overthink I end up in a bad place in my mind and it’s difficult to be around people,” resulting in self-isolation. Panic episodes once per week described as difficulty breathing, racing thoughts, shakiness, and “felt like I couldn’t walk,” most recent episode 6/28/22. Mickey reports current mild depressed mood that has improved over the last 2 weeks, decreased appetite, and difficulty sleeping (works night shifts). Mickey notes he broke up in his girlfriend 5/2022 and at that time experienced worsening decreased appetite, 20 lbs unintentional weight loss, excessive crying, loss of motivation “difficulty going to work for 2 weeks,” and hopelessness that has since improved due to getting back together recently. (+) recent passive SI “I had thoughts of wanting to hurt myself, I wish I didn’t wake up,” “it’s when I feel like my life isn’t going anywhere,” none currently, no intent or plan, no hx of SA. Mickey reports episodes of difficulty with emotional regulation “when I don’t know how to handle a situation,” specifically high stress situations, described as “irritability, explosiveness, and frustration” resulting in physical aggression towards property, no HI. In 2021 he injured his hand by punching a piece of metal. No IPP admissions.

## 2023-03-13 NOTE — SOCIAL HISTORY
[With Family] : lives with family [Employed] : employed [Never ] : never  [High School] : high school [None] : none [FreeTextEntry2] : full time at [employer] 1 year [Yes] : yes [TextBox_7] : social drinker, on special occasions [FreeTextEntry1] : regular marijuana use [FreeTextEntry9] : unprescribed Percocet recreationally, last use 2020 [TextBox_52] : unprescribed Xanax recreationally, last use 2020 [TextBox_62] : acid, last use 2020

## 2023-03-13 NOTE — REASON FOR VISIT
[Patient preference] : as per patient preference [Telehealth (audio & video) - Individual/Group] : This visit was provided via telehealth using real-time 2-way audio visual technology. [This encounter was initiated by telehealth (audio with video) and converted to telephone (audio only) due to technical difficulties.] : This encounter was initiated by telehealth (audio with video) and converted to telephone (audio only) due to technical difficulties. [Medical Office: ([facility])___] : The provider was located at the medical office in [unfilled]. [Home] : The patient, [unfilled], was located at home, [unfilled], at the time of the visit. [Verbal consent obtained from patient/other participant(s)] : Verbal consent for telehealth/telephonic services obtained from patient/other participant(s) [FreeTextEntry4] : 2:15pm [FreeTextEntry5] : 2:40pm [Patient] : Patient [FreeTextEntry1] : "I am same, have anxiety, crying at times, afraid to be around crowds.."

## 2023-03-13 NOTE — CURRENT PSYCHIATRIC SYMPTOMS
[Depressed Mood] : depressed mood [Anhedonia] : no anhedonia [Guilt] : not feeling guilty [Decreased Concentration] : decreased concentrating ability [Hyperphagia] : no hyperphagia [Insomnia] : insomnia [Hypersomnia] : no ~T hypersomnia [Psychomotor Retardation] : no psychomotor retardation [Anorexia] : anorexia [Euphoria] : no euphoria [Highly Irritable] : high irritability [Increased Activity] : no increased in activity [Distractibility] : distractibility [Talkativeness] : no talkativeness [Grandeur] : no feelings of grandeur [Buying Sprees] : no buying sprees [Hypersexuality] : denied hypersexuality [Dec Need For Sleep] : no decreased need for sleep [Delusions] : no ~T delusions [Hallucination Visual] : no visual hallucinations [Hallucination Auditory] : no auditory hallucinations [Hallucination Tactile] : no tactile hallucinations [Thought Disorder] : ~T a thought disorder was not noted [Excessive Worry] : excessive worry [Ruminations] : no rumination disorder [Obsessions] : no obsessions [Re-experiencing] : no re-experiencing [Restlessness] : no restlessness [Hypochondriasis] : no hypochondriasis [Panic] : panic  [de-identified] : (+) decreased appetite [de-identified] : irritability possibly related to anxiety?, impulsivity with spending

## 2023-03-13 NOTE — DISCUSSION/SUMMARY
[FreeTextEntry1] : Patient has ongoing epression, anxiety\par Continue medication:\par 1. gabapentin 400mg po TID\par 2. abilify 5mg po daily\par 3. raise mirtazapine 15mg to 30mg at bedtime\par \par Follow up in 2 weeks, earlier as needed

## 2023-03-13 NOTE — HISTORY OF PRESENT ILLNESS
[FreeTextEntry1] : This is a 22 year old patient who presents for medication management.  The patient reports above mood,  fair sleep and appetite.  The patient denies depression, horace, denies psychosis at this time.  The patient has some increased anxiety that impairs their daily functioning. The patient denies any suicidal ideation, homicidal ideation, no auditory or visual hallucinations, or paranoid ideation.  The patient has no medical complaints. The patient denies any alcohol use, and is not smoking at this time.  He will try to reduce and stop cannabis use. I received the patient's updated physical and labs from their PCP.  Coping skills were discussed and a safety plan was provided.  The patient was educated on the risks, benefits, and alternatives of their psychiatric medications.  The patient will engage in psychotherapy at this time.  The patient consents to ongoing medication management.\par \par Adjust medications.\par \par 2/28:patient called and stated he had a bad panic attack and was crying, risks/benefits discussed, will raise remeron to 15mg at bedtime, patient will call in a few days as needed, and will be seen in 2 weeks, earlier if needed\par 3/13: raise remeron for ongoing depression, anxiety [No] : no [TextBox_32] : +) recent passive SI “I had thoughts of wanting to hurt myself, I wish I didn’t wake up,” “it’s when I feel like my life isn’t going anywhere,” none current, no intent or plan, no hx of SA. [de-identified] : \par  [None] : none [Responsibility to family or others] : responsibility to family or others [Identifies reasons for living] : identifies reasons for living [Future oriented] : future oriented [Engaged in work or school] : engaged in work or school [TextBox_52] : +) recent passive SI “I had thoughts of wanting to hurt myself, I wish I didn’t wake up,” “it’s when I feel like my life isn’t going anywhere,” none current, no intent or plan, no hx of SA. [None Known] : none known [Yes] : yes [Irritability] : irritability [Residential stability] : residential stability [Relationship stability] : relationship stability [Employment stability] : employment stability [Affective stability] : affective stability

## 2023-03-14 DIAGNOSIS — F34.0 CYCLOTHYMIC DISORDER: ICD-10-CM

## 2023-03-14 DIAGNOSIS — G47.00 INSOMNIA, UNSPECIFIED: ICD-10-CM

## 2023-03-14 DIAGNOSIS — F41.1 GENERALIZED ANXIETY DISORDER: ICD-10-CM

## 2023-03-15 ENCOUNTER — APPOINTMENT (OUTPATIENT)
Dept: PSYCHIATRY | Facility: CLINIC | Age: 23
End: 2023-03-15

## 2023-03-15 ENCOUNTER — NON-APPOINTMENT (OUTPATIENT)
Age: 23
End: 2023-03-15

## 2023-03-15 PROBLEM — R55 PRE-SYNCOPE: Status: ACTIVE | Noted: 2023-01-17

## 2023-03-16 ENCOUNTER — NON-APPOINTMENT (OUTPATIENT)
Age: 23
End: 2023-03-16

## 2023-03-24 NOTE — ED PROVIDER NOTE - ATTENDING APP SHARED VISIT CONTRIBUTION OF CARE
----- Message from Sienna Person RN sent at 3/21/2023 11:01 AM CDT -----  Regarding: INR  Amee will obtain her INR on her home monitor on Friday 3/24/23; this is a 3 day recheck due to supratherapeutic INR on Tuesday, she reported having a poor appetite/not being able to eat well. She is scheduled to have 2 teeth extractions on Monday 3/27/23 at 2pm and INR is to be 2.5 or less than. Amee will need to check her INR on Monday 3/27 and results faxed to [phone]. Typically checks on Tuesdays'.     patient temperature is hypothermic 22-year-old male with history of anxiety, depression, on gabapentin and quetiapine, presents with syncope. Onset at 5:30 PM today when he got up from a supine position and braced himself against his mother, falling forward onto mother, and passed out. Patient states at the time he states he could not feel his body, speak, or breathe. Interviewed separately from mom, states smokes marijuana daily but not today, and denies drug use. A few days ago was sick with the flu and had vomiting and has had poor p.o. intake since then, though no vomiting x3 days. Patient and mom deny head trauma, diarrhea, urinary symptoms, fever, cough, chest pain, and all other symptoms. Mom confirms that patient has been on his 2 medications for at least 4 months with no change, but sometimes after he takes Seroquel he feels dizzy. On exam, afebrile, hemodynamically stable, saturating well on room air, NAD, well appearing, laying comfortably in bed, no WOB, speaking full sentences, head NCAT, EOMI grossly, anicteric, MMM, no JVD, RRR, nml S1/S2, no m/r/g, lungs CTAB, no w/r/r, abd soft, NT, ND, nml BS, no rebound or guarding, AAO, CN's 3-12 grossly intact, WHITING spontaneously, no leg cyanosis or edema, skin warm, well perfused, no rashes or hives. Character low suspicion for CVA and no focal or localizing findings. No significant arrhythmia including QTc prolongation or e/o aortic outflow obstruction to warrant obs/admission for echo/monitoring. No anemia or bleeding or gross electrolyte abnormalities. No CP/SOB to suggest ACS or e/o DVT to suggest PE. No fever or specific infectious symptoms at this time, though recent vomiting and poor appetite likely contributing to his symptoms. Patient has been on the same dose of Seroquel for the past few months. Discussed with patient and mom ability for this medication to cause dizziness, need to keep hydrated, and imperative to follow-up closely with psychiatrist for likely medication changes. [place] syncope score low risk at -1. Patient is well appearing, NAD, afebrile, hemodynamically stable. Any available tests and studies were discussed with patient and mom. Discharged with instructions in further symptomatic care, return precautions, and need for psychiatry f/u.

## 2023-03-29 ENCOUNTER — OUTPATIENT (OUTPATIENT)
Dept: OUTPATIENT SERVICES | Facility: HOSPITAL | Age: 23
LOS: 1 days | End: 2023-03-29
Payer: MEDICAID

## 2023-03-29 ENCOUNTER — APPOINTMENT (OUTPATIENT)
Dept: PSYCHIATRY | Facility: CLINIC | Age: 23
End: 2023-03-29
Payer: MEDICAID

## 2023-03-29 DIAGNOSIS — F41.1 GENERALIZED ANXIETY DISORDER: ICD-10-CM

## 2023-03-29 DIAGNOSIS — G47.00 INSOMNIA, UNSPECIFIED: ICD-10-CM

## 2023-03-29 DIAGNOSIS — F34.0 CYCLOTHYMIC DISORDER: ICD-10-CM

## 2023-03-29 PROCEDURE — 99214 OFFICE O/P EST MOD 30 MIN: CPT | Mod: 95

## 2023-03-29 PROCEDURE — 99214 OFFICE O/P EST MOD 30 MIN: CPT

## 2023-03-29 RX ORDER — GABAPENTIN 400 MG/1
400 CAPSULE ORAL 3 TIMES DAILY
Qty: 90 | Refills: 1 | Status: DISCONTINUED | COMMUNITY
Start: 2022-08-24 | End: 2023-03-29

## 2023-03-29 NOTE — HISTORY OF PRESENT ILLNESS
[FreeTextEntry1] : This is a 22 year old patient who presents for medication management.  The patient reports above mood,  fair sleep and appetite.  The patient denies depression, horace, denies psychosis at this time.  The patient has some increased anxiety that impairs their daily functioning. The patient denies any suicidal ideation, homicidal ideation, no auditory or visual hallucinations, or paranoid ideation.  The patient has no medical complaints. The patient denies any alcohol use, and is not smoking at this time.  He will try to reduce and stop cannabis use. I received the patient's updated physical and labs from their PCP.  Coping skills were discussed and a safety plan was provided.  The patient was educated on the risks, benefits, and alternatives of their psychiatric medications.  The patient will engage in psychotherapy at this time.  The patient consents to ongoing medication management.\par \par Adjust medications.\par \par 2/28:patient called and stated he had a bad panic attack and was crying, risks/benefits discussed, will raise remeron to 15mg at bedtime, patient will call in a few days as needed, and will be seen in 2 weeks, earlier if needed\par 3/13: raise remeron for ongoing depression, anxiety\par 3/29:raise gabapentin for anxiety [No] : no [TextBox_32] : +) recent passive SI “I had thoughts of wanting to hurt myself, I wish I didn’t wake up,” “it’s when I feel like my life isn’t going anywhere,” none current, no intent or plan, no hx of SA. [de-identified] : \par  [None] : none [Responsibility to family or others] : responsibility to family or others [Identifies reasons for living] : identifies reasons for living [Future oriented] : future oriented [Engaged in work or school] : engaged in work or school [TextBox_52] : +) recent passive SI “I had thoughts of wanting to hurt myself, I wish I didn’t wake up,” “it’s when I feel like my life isn’t going anywhere,” none current, no intent or plan, no hx of SA. [None Known] : none known [Yes] : yes [Irritability] : irritability [Residential stability] : residential stability [Relationship stability] : relationship stability [Employment stability] : employment stability [Affective stability] : affective stability

## 2023-03-29 NOTE — SOCIAL HISTORY
[With Family] : lives with family [Employed] : employed [Never ] : never  [High School] : high school [None] : none [FreeTextEntry2] : full time at [employer] 1 year [Yes] : yes [TextBox_7] : social drinker, on special occasions [FreeTextEntry1] : regular marijuana use [FreeTextEntry9] : unprescribed Percocet recreationally, last use 2020 [TextBox_52] : unprescribed Xanax recreationally, last use 2020 [TextBox_62] : acid, last use 2020

## 2023-03-29 NOTE — CURRENT PSYCHIATRIC SYMPTOMS
[Depressed Mood] : no depressed mood [Anhedonia] : no anhedonia [Guilt] : not feeling guilty [Decreased Concentration] : no decrease in concentrating ability [Hyperphagia] : no hyperphagia [Insomnia] : no insomnia disorder [Hypersomnia] : no ~T hypersomnia [Psychomotor Retardation] : no psychomotor retardation [Anorexia] : no anorexia [Euphoria] : no euphoria [Highly Irritable] : no high irritability [Increased Activity] : no increased in activity [Distractibility] : not distracted [Talkativeness] : no talkativeness [Grandeur] : no feelings of grandeur [Buying Sprees] : no buying sprees [Hypersexuality] : denied hypersexuality [Dec Need For Sleep] : no decreased need for sleep [Delusions] : no ~T delusions [Hallucination Visual] : no visual hallucinations [Hallucination Auditory] : no auditory hallucinations [Hallucination Tactile] : no tactile hallucinations [Thought Disorder] : ~T a thought disorder was not noted [Excessive Worry] : excessive worry [Ruminations] : no rumination disorder [Obsessions] : no obsessions [Re-experiencing] : no re-experiencing [Restlessness] : no restlessness [Hypochondriasis] : no hypochondriasis [Panic] : panic  [de-identified] : anxious

## 2023-03-29 NOTE — PAST MEDICAL HISTORY
[FreeTextEntry1] : 20 y/o single M referred by ED following ED visits 6/2/22 and 6/13/22 for worsening anxiety and prescribed hydroxyzine with improvement (“more calm with it”), regular marijuana user, PMH hearing impaired R ear, no reported PPH, saw therapist “for a few sessions” at unknown organization at 19 y/o but discontinued treatment due to “I didn’t feel comfortable there,” has never before seen psych, currently employed full time at [employer] 1 year, currently domiciled with mother, stepfather (who raised him from birth), and 3 ½  sisters (17 y/o F, 10 y/o F, 7 y/o F) in private apartment (limited relationship with biological father). As per [employer], Mickey originally contacted [facility] 12/2021 and endorsed substance use at that time and was referred to [facility] but Mickey did not follow-up. When asked, Mickey states he last used unprescribed Xanax, Percocet, and acid recreationally 2 years ago, denies any current use (notes while on acid saw colors/lights that has continued and worried about permanency, encouraged to follow up with eye doctor?). Mickey reports “extreme anxiety and anger issues,” describing anxiety as excessive worry, worsening when in social situations, “I overthink a lot and the more I overthink I end up in a bad place in my mind and it’s difficult to be around people,” resulting in self-isolation. Panic episodes once per week described as difficulty breathing, racing thoughts, shakiness, and “felt like I couldn’t walk,” most recent episode 6/28/22. Mickey reports current mild depressed mood that has improved over the last 2 weeks, decreased appetite, and difficulty sleeping (works night shifts). Mickey notes he broke up in his girlfriend 5/2022 and at that time experienced worsening decreased appetite, 20 lbs unintentional weight loss, excessive crying, loss of motivation “difficulty going to work for 2 weeks,” and hopelessness that has since improved due to getting back together recently. (+) recent passive SI “I had thoughts of wanting to hurt myself, I wish I didn’t wake up,” “it’s when I feel like my life isn’t going anywhere,” none currently, no intent or plan, no hx of SA. Mickey reports episodes of difficulty with emotional regulation “when I don’t know how to handle a situation,” specifically high stress situations, described as “irritability, explosiveness, and frustration” resulting in physical aggression towards property, no HI. In 2021 he injured his hand by punching a piece of metal. No IPP admissions.

## 2023-03-29 NOTE — DISCUSSION/SUMMARY
[FreeTextEntry1] : Patient has less depression, ongoing anxiety\par Continue medication:\par 1. gabapentin 400mg po TID, raise to 600mg po TID\par 2. abilify 5mg po daily\par 3. mirtazapine 30mg at bedtime\par \par Follow up in 4 weeks, earlier as needed

## 2023-03-30 DIAGNOSIS — G47.00 INSOMNIA, UNSPECIFIED: ICD-10-CM

## 2023-03-30 DIAGNOSIS — F34.0 CYCLOTHYMIC DISORDER: ICD-10-CM

## 2023-03-30 DIAGNOSIS — F41.1 GENERALIZED ANXIETY DISORDER: ICD-10-CM

## 2023-03-31 ENCOUNTER — APPOINTMENT (OUTPATIENT)
Dept: PSYCHIATRY | Facility: CLINIC | Age: 23
End: 2023-03-31

## 2023-03-31 ENCOUNTER — OUTPATIENT (OUTPATIENT)
Dept: OUTPATIENT SERVICES | Facility: HOSPITAL | Age: 23
LOS: 1 days | End: 2023-03-31
Payer: MEDICAID

## 2023-03-31 DIAGNOSIS — F41.1 GENERALIZED ANXIETY DISORDER: ICD-10-CM

## 2023-03-31 PROCEDURE — 90832 PSYTX W PT 30 MINUTES: CPT

## 2023-03-31 NOTE — REASON FOR VISIT
[Patient] : Patient [FreeTextEntry1] : Patient is a 22 year old male seen for a followup therapy session, 2:30 to 2:55 PM

## 2023-03-31 NOTE — PLAN
[FreeTextEntry2] : Treatment Planning: \par Goal #1 " I want to manage my emotions and to have a healthy relationship" \par \par Obj#1 Patient will express feelings and at least 2 sources of mood disturbance. \par Obj#2 Patient will learn and utilize at least 2 positive coping, conflict resolution, or CBT methods, \par \par Goal #2 " I don't want to feel calm and not over think" \par \par Obj#1 Patient will identify at least 2 trigger of anxiety \par Obj#2 Patient will learn at least 2 relaxation methods other than use of substances. \par  [[facility]] : [facility]  [Supportive Therapy] : Supportive Therapy [de-identified] : Patient reports that he is still having depressive feelings at time related to his break up. He reports that his psychiatrist, Dr. Sanford, prescribed Gabapentin in addition to his other medication which has been helping his anxiety. Help patient express  mixed or unresolved feelings about loss of that relationship. He reports that his sleep is fair and he is still eating once a day. He reports that he still is isolating despite getting his skateboard. He agrees that skateboarding would be a way to get out more and a positive outlet for him. He reports that he has been reading which is also a positive outlet. He reports that he is still vaping which he does not want to stop and still uses Marijuana oil related to being bored. He would like to utilize his time more constructively. He continues to work for [employer] but would like to eventually get a better job. He feels he is not focusing enough on himself since he is used to thinking of other first. Helped patient explore his interests.  [Recommended Frequency of Visits: ____] : Recommended frequency of visits: [unfilled] [Return in ____ week(s)] : Return in [unfilled] week(s) [FreeTextEntry1] : Patient will focus on positive coping methods, reducing isolation, and exploring other interests in order to utilize time more constructively. He will continue medication regimen and followup with therapy on 4/17.  decreased strength/impaired balance/decreased flexibility

## 2023-04-01 DIAGNOSIS — F41.1 GENERALIZED ANXIETY DISORDER: ICD-10-CM

## 2023-04-17 ENCOUNTER — APPOINTMENT (OUTPATIENT)
Dept: PSYCHIATRY | Facility: CLINIC | Age: 23
End: 2023-04-17

## 2023-04-17 ENCOUNTER — NON-APPOINTMENT (OUTPATIENT)
Age: 23
End: 2023-04-17

## 2023-04-21 ENCOUNTER — NON-APPOINTMENT (OUTPATIENT)
Age: 23
End: 2023-04-21

## 2023-04-24 ENCOUNTER — APPOINTMENT (OUTPATIENT)
Dept: PSYCHIATRY | Facility: CLINIC | Age: 23
End: 2023-04-24
Payer: MEDICAID

## 2023-04-24 ENCOUNTER — OUTPATIENT (OUTPATIENT)
Dept: OUTPATIENT SERVICES | Facility: HOSPITAL | Age: 23
LOS: 1 days | End: 2023-04-24
Payer: MEDICAID

## 2023-04-24 DIAGNOSIS — G47.00 INSOMNIA, UNSPECIFIED: ICD-10-CM

## 2023-04-24 DIAGNOSIS — F41.1 GENERALIZED ANXIETY DISORDER: ICD-10-CM

## 2023-04-24 PROCEDURE — 99214 OFFICE O/P EST MOD 30 MIN: CPT

## 2023-04-24 NOTE — PAST MEDICAL HISTORY
[FreeTextEntry1] : 20 y/o single M referred by ED following ED visits 6/2/22 and 6/13/22 for worsening anxiety and prescribed hydroxyzine with improvement (“more calm with it”), regular marijuana user, PMH hearing impaired R ear, no reported PPH, saw therapist “for a few sessions” at unknown organization at 17 y/o but discontinued treatment due to “I didn’t feel comfortable there,” has never before seen psych, currently employed full time at [employer] 1 year, currently domiciled with mother, stepfather (who raised him from birth), and 3 ½  sisters (15 y/o F, 10 y/o F, 7 y/o F) in private apartment (limited relationship with biological father). As per [employer], Mickey originally contacted [facility] 12/2021 and endorsed substance use at that time and was referred to [facility] but Mickey did not follow-up. When asked, Mickey states he last used unprescribed Xanax, Percocet, and acid recreationally 2 years ago, denies any current use (notes while on acid saw colors/lights that has continued and worried about permanency, encouraged to follow up with eye doctor?). Mickey reports “extreme anxiety and anger issues,” describing anxiety as excessive worry, worsening when in social situations, “I overthink a lot and the more I overthink I end up in a bad place in my mind and it’s difficult to be around people,” resulting in self-isolation. Panic episodes once per week described as difficulty breathing, racing thoughts, shakiness, and “felt like I couldn’t walk,” most recent episode 6/28/22. Mickey reports current mild depressed mood that has improved over the last 2 weeks, decreased appetite, and difficulty sleeping (works night shifts). Mickey notes he broke up in his girlfriend 5/2022 and at that time experienced worsening decreased appetite, 20 lbs unintentional weight loss, excessive crying, loss of motivation “difficulty going to work for 2 weeks,” and hopelessness that has since improved due to getting back together recently. (+) recent passive SI “I had thoughts of wanting to hurt myself, I wish I didn’t wake up,” “it’s when I feel like my life isn’t going anywhere,” none currently, no intent or plan, no hx of SA. Mickey reports episodes of difficulty with emotional regulation “when I don’t know how to handle a situation,” specifically high stress situations, described as “irritability, explosiveness, and frustration” resulting in physical aggression towards property, no HI. In 2021 he injured his hand by punching a piece of metal. No IPP admissions.

## 2023-04-24 NOTE — HISTORY OF PRESENT ILLNESS
[FreeTextEntry1] : This is a 22 year old patient who presents for medication management.  The patient reports above mood,  fair sleep and appetite.  The patient denies depression, horace, denies psychosis at this time.  The patient has some increased anxiety that impairs their daily functioning. The patient denies any suicidal ideation, homicidal ideation, no auditory or visual hallucinations, or paranoid ideation.  The patient has no medical complaints. The patient denies any alcohol use, and is not smoking at this time.  He will try to reduce and stop cannabis use. I received the patient's updated physical and labs from their PCP.  Coping skills were discussed and a safety plan was provided.  The patient was educated on the risks, benefits, and alternatives of their psychiatric medications.  The patient will engage in psychotherapy at this time.  The patient consents to ongoing medication management.\par \par Adjust medications.\par \par 2/28:patient called and stated he had a bad panic attack and was crying, risks/benefits discussed, will raise remeron to 15mg at bedtime, patient will call in a few days as needed, and will be seen in 2 weeks, earlier if needed\par 3/13: raise remeron for ongoing depression, anxiety\par 3/29:raise gabapentin for anxiety\par 4/24:raise mirtazapine [No] : no [TextBox_32] : +) recent passive SI “I had thoughts of wanting to hurt myself, I wish I didn’t wake up,” “it’s when I feel like my life isn’t going anywhere,” none current, no intent or plan, no hx of SA. [de-identified] : \par  [None] : none [Responsibility to family or others] : responsibility to family or others [Identifies reasons for living] : identifies reasons for living [Future oriented] : future oriented [Engaged in work or school] : engaged in work or school [TextBox_52] : +) recent passive SI “I had thoughts of wanting to hurt myself, I wish I didn’t wake up,” “it’s when I feel like my life isn’t going anywhere,” none current, no intent or plan, no hx of SA. [None Known] : none known [Yes] : yes [Irritability] : irritability [Residential stability] : residential stability [Relationship stability] : relationship stability [Employment stability] : employment stability [Affective stability] : affective stability

## 2023-04-24 NOTE — DISCUSSION/SUMMARY
[FreeTextEntry1] : Patient has less depression, ongoing anxiety\par Continue medication:\par 1. gabapentin 600mg po TID\par 2. abilify 5mg po daily\par 3. raise mirtazapine 30mg at bedtime to 45mg at bedtime\par \par Follow up in 4 weeks, earlier as needed

## 2023-04-24 NOTE — PLAN
[FreeTextEntry4] : mood is depressed\par \par anxiety is increased\par \par health is good, other than recent adverse reaction to seroquel, but poor appetite\par \par working and going to school\par \par elected to d/c therapy

## 2023-04-24 NOTE — SOCIAL HISTORY
[With Family] : lives with family [Employed] : employed [Never ] : never  [High School] : high school [None] : none [FreeTextEntry2] : full time at [employer] 1 year [Yes] : yes [TextBox_7] : social drinker, on special occasions [FreeTextEntry1] : regular marijuana use [FreeTextEntry9] : unprescribed Percocet recreationally, last use 2020 [TextBox_52] : unprescribed Xanax recreationally, last use 2020 [TextBox_62] : acid, last use 2020

## 2023-04-24 NOTE — CURRENT PSYCHIATRIC SYMPTOMS
[Depressed Mood] : no depressed mood [Anhedonia] : no anhedonia [Guilt] : not feeling guilty [Decreased Concentration] : no decrease in concentrating ability [Hyperphagia] : no hyperphagia [Insomnia] : no insomnia disorder [Hypersomnia] : no ~T hypersomnia [Psychomotor Retardation] : no psychomotor retardation [Anorexia] : no anorexia [Euphoria] : no euphoria [Highly Irritable] : no high irritability [Increased Activity] : no increased in activity [Distractibility] : not distracted [Talkativeness] : no talkativeness [Grandeur] : no feelings of grandeur [Buying Sprees] : no buying sprees [Hypersexuality] : denied hypersexuality [Dec Need For Sleep] : no decreased need for sleep [Delusions] : no ~T delusions [Hallucination Visual] : no visual hallucinations [Hallucination Auditory] : no auditory hallucinations [Hallucination Tactile] : no tactile hallucinations [Thought Disorder] : ~T a thought disorder was not noted [Excessive Worry] : excessive worry [Ruminations] : no rumination disorder [Obsessions] : no obsessions [Re-experiencing] : no re-experiencing [Restlessness] : no restlessness [Hypochondriasis] : no hypochondriasis [Panic] : panic  [de-identified] : anxious

## 2023-04-25 DIAGNOSIS — F41.1 GENERALIZED ANXIETY DISORDER: ICD-10-CM

## 2023-04-25 DIAGNOSIS — G47.00 INSOMNIA, UNSPECIFIED: ICD-10-CM

## 2023-05-22 ENCOUNTER — OUTPATIENT (OUTPATIENT)
Dept: OUTPATIENT SERVICES | Facility: HOSPITAL | Age: 23
LOS: 1 days | End: 2023-05-22
Payer: MEDICAID

## 2023-05-22 ENCOUNTER — APPOINTMENT (OUTPATIENT)
Dept: PSYCHIATRY | Facility: CLINIC | Age: 23
End: 2023-05-22
Payer: MEDICAID

## 2023-05-22 DIAGNOSIS — G47.00 INSOMNIA, UNSPECIFIED: ICD-10-CM

## 2023-05-22 DIAGNOSIS — F34.0 CYCLOTHYMIC DISORDER: ICD-10-CM

## 2023-05-22 DIAGNOSIS — F41.1 GENERALIZED ANXIETY DISORDER: ICD-10-CM

## 2023-05-22 PROCEDURE — 99214 OFFICE O/P EST MOD 30 MIN: CPT | Mod: 95

## 2023-05-22 PROCEDURE — 99214 OFFICE O/P EST MOD 30 MIN: CPT

## 2023-05-22 RX ORDER — MIRTAZAPINE 15 MG/1
15 TABLET, FILM COATED ORAL
Qty: 30 | Refills: 0 | Status: COMPLETED | COMMUNITY
Start: 2023-02-28

## 2023-05-22 RX ORDER — MIRTAZAPINE 30 MG/1
30 TABLET, FILM COATED ORAL
Qty: 30 | Refills: 0 | Status: COMPLETED | COMMUNITY
Start: 2023-03-13

## 2023-05-22 NOTE — SOCIAL HISTORY
[With Family] : lives with family [Employed] : employed [Never ] : never  [High School] : high school [None] : none [FreeTextEntry2] : full time at [employer] 1 year [Yes] : yes [TextBox_7] : social drinker, on special occasions [FreeTextEntry1] : regular marijuana use [FreeTextEntry9] : unprescribed Percocet recreationally, last use 2020 [TextBox_52] : unprescribed Xanax recreationally, last use 2020 [TextBox_62] : acid, last use 2020

## 2023-05-22 NOTE — DISCUSSION/SUMMARY
[Initial Plan] : Initial Plan [Adherent to treatment recommendations] : adherent to treatment recommendations [Articulate] : articulate [Cognitively intact] : cognitively intact [Motivated to participate in treatment] : motivated to participate in treatment [Motivated to maintain or improve physical health] : motivated to maintain or improve physical health [Part of a supportive family] : part of a supportive family [Housing stability] : housing stability [English fluency] : English fluency [Connected to healthcare] : connected to healthcare [Social supports] : social supports [FreeTextEntry2] : 2/15/23 [FreeTextEntry3] : 7/20/22 [FreeTextEntry8] : None  [FreeTextEntry9] : None  [de-identified] : None at this time  [Interpersonal Relationships] : Interpersonal Relationships [every ___ months] : every [unfilled] months [Mental Health] : Mental Health [Substance Abuse] : Substance Abuse [Initial] : Initial [Treatment is no longer medically necessary as evidenced by:] : Treatment is no longer medically necessary as evidenced by: [None - Reason others did not participate:] : None - Reason others did not participate:  [Yes] : Yes [Psychiatric Provider/Prescriber] : Psychiatric Provider/Prescriber [Therapist] : Therapist [FreeTextEntry1] : Anxiety including racing thoughts. Patient uses Marijuana to calm himself. He used to use acid.  [FreeTextEntry4] : " I don't want to over think"  [de-identified] : Patient describes  anxiety symptoms which include over thinking, racing thoughts, and excessive worry.  [de-identified] : Patient will identity at least 2 triggers of anxiety including cognitive ones.  [de-identified] : 11/15/22 [de-identified] : Patient wants help in reducing anxiety anxiety.  [de-identified] : Patient will learn at least 2 relaxation methods.  [de-identified] : 11/15/22 [FreeTextEntry5] : Motivational interviewing, Psychoeducation, CBT, and Supportive Individual Therapy [de-identified] : Patient no longer needs therapy and/or medication to function at baseline.  [de-identified] : Not clinically indicated

## 2023-05-22 NOTE — CURRENT PSYCHIATRIC SYMPTOMS
[Depressed Mood] : no depressed mood [Anhedonia] : no anhedonia [Guilt] : not feeling guilty [Decreased Concentration] : no decrease in concentrating ability [Hyperphagia] : no hyperphagia [Insomnia] : no insomnia disorder [Hypersomnia] : no ~T hypersomnia [Psychomotor Retardation] : no psychomotor retardation [Anorexia] : no anorexia [Euphoria] : no euphoria [Highly Irritable] : no high irritability [Increased Activity] : no increased in activity [Distractibility] : not distracted [Talkativeness] : no talkativeness [Grandeur] : no feelings of grandeur [Buying Sprees] : no buying sprees [Hypersexuality] : denied hypersexuality [Dec Need For Sleep] : no decreased need for sleep [Delusions] : no ~T delusions [Hallucination Visual] : no visual hallucinations [Hallucination Auditory] : no auditory hallucinations [Hallucination Tactile] : no tactile hallucinations [Thought Disorder] : ~T a thought disorder was not noted [Excessive Worry] : excessive worry [Ruminations] : no rumination disorder [Obsessions] : no obsessions [Re-experiencing] : no re-experiencing [Restlessness] : no restlessness [Hypochondriasis] : no hypochondriasis [Panic] : panic  [de-identified] : anxious

## 2023-05-22 NOTE — PLAN
[FreeTextEntry4] : mood is less depressed\par \par anxiety is less\par \par health is good, other than recent adverse reaction to seroquel, but poor appetite\par \par working and going to school\par \par elected to d/c therapy

## 2023-05-22 NOTE — SOCIAL HISTORY
[With Family] : lives with family [Employed] : employed [Never ] : never  [High School] : high school [None] : none [FreeTextEntry2] : full time at [employer] 1 year [Yes] : yes [TextBox_7] : social drinker, on special occasions [FreeTextEntry1] : regular marijuana use [FreeTextEntry9] : unprescribed Percocet recreationally, last use 2020 [TextBox_52] : unprescribed Xanax recreationally, last use 2020 [TextBox_62] : acid, last use 2020

## 2023-05-22 NOTE — HISTORY OF PRESENT ILLNESS
[FreeTextEntry1] : This is a 22 year old patient who presents for medication management.  The patient reports above mood,  fair sleep and appetite.  The patient denies depression, horace, denies psychosis at this time.  The patient has some increased anxiety that impairs their daily functioning. The patient denies any suicidal ideation, homicidal ideation, no auditory or visual hallucinations, or paranoid ideation.  The patient has no medical complaints. The patient denies any alcohol use, and is not smoking at this time.  He will try to reduce and stop cannabis use. I received the patient's updated physical and labs from their PCP.  Coping skills were discussed and a safety plan was provided.  The patient was educated on the risks, benefits, and alternatives of their psychiatric medications.  The patient will engage in psychotherapy at this time.  The patient consents to ongoing medication management.\par \par Adjust medications.\par \par 2/28:patient called and stated he had a bad panic attack and was crying, risks/benefits discussed, will raise remeron to 15mg at bedtime, patient will call in a few days as needed, and will be seen in 2 weeks, earlier if needed\par 3/13: raise remeron for ongoing depression, anxiety\par 3/29:raise gabapentin for anxiety\par 4/24:raise mirtazapine\par 5/22:raise abilify [No] : no [TextBox_32] : +) recent passive SI “I had thoughts of wanting to hurt myself, I wish I didn’t wake up,” “it’s when I feel like my life isn’t going anywhere,” none current, no intent or plan, no hx of SA. [de-identified] : \par  [None] : none [Responsibility to family or others] : responsibility to family or others [Identifies reasons for living] : identifies reasons for living [Future oriented] : future oriented [Engaged in work or school] : engaged in work or school [TextBox_52] : +) recent passive SI “I had thoughts of wanting to hurt myself, I wish I didn’t wake up,” “it’s when I feel like my life isn’t going anywhere,” none current, no intent or plan, no hx of SA. [None Known] : none known [Yes] : yes [Irritability] : irritability [Residential stability] : residential stability [Relationship stability] : relationship stability [Employment stability] : employment stability [Affective stability] : affective stability

## 2023-05-22 NOTE — REASON FOR VISIT
[Patient preference] : as per patient preference [Telehealth (audio & video) - Individual/Group] : This visit was provided via telehealth using real-time 2-way audio visual technology. [Medical Office: ([facility])___] : The provider was located at the medical office in [unfilled]. [Home] : The patient, [unfilled], was located at home, [unfilled], at the time of the visit. [FreeTextEntry4] : 246pm [FreeTextEntry5] : 301pm [FreeTextEntry1] : "I am still up and down, can we adjust the medication."

## 2023-05-22 NOTE — HISTORY OF PRESENT ILLNESS
[FreeTextEntry1] : This is a 22 year old patient who presents for medication management.  The patient reports above mood,  fair sleep and appetite.  The patient denies depression, horace, denies psychosis at this time.  The patient has some increased anxiety that impairs their daily functioning. The patient denies any suicidal ideation, homicidal ideation, no auditory or visual hallucinations, or paranoid ideation.  The patient has no medical complaints. The patient denies any alcohol use, and is not smoking at this time.  He will try to reduce and stop cannabis use. I received the patient's updated physical and labs from their PCP.  Coping skills were discussed and a safety plan was provided.  The patient was educated on the risks, benefits, and alternatives of their psychiatric medications.  The patient will engage in psychotherapy at this time.  The patient consents to ongoing medication management.\par \par Adjust medications.\par \par 2/28:patient called and stated he had a bad panic attack and was crying, risks/benefits discussed, will raise remeron to 15mg at bedtime, patient will call in a few days as needed, and will be seen in 2 weeks, earlier if needed\par 3/13: raise remeron for ongoing depression, anxiety\par 3/29:raise gabapentin for anxiety\par 4/24:raise mirtazapine\par 5/22:raise abilify [No] : no [TextBox_32] : +) recent passive SI “I had thoughts of wanting to hurt myself, I wish I didn’t wake up,” “it’s when I feel like my life isn’t going anywhere,” none current, no intent or plan, no hx of SA. [de-identified] : \par  [None] : none [Responsibility to family or others] : responsibility to family or others [Identifies reasons for living] : identifies reasons for living [Future oriented] : future oriented [Engaged in work or school] : engaged in work or school [TextBox_52] : +) recent passive SI “I had thoughts of wanting to hurt myself, I wish I didn’t wake up,” “it’s when I feel like my life isn’t going anywhere,” none current, no intent or plan, no hx of SA. [None Known] : none known [Yes] : yes [Irritability] : irritability [Residential stability] : residential stability [Relationship stability] : relationship stability [Employment stability] : employment stability [Affective stability] : affective stability

## 2023-05-22 NOTE — PAST MEDICAL HISTORY
[FreeTextEntry1] : 20 y/o single M referred by ED following ED visits 6/2/22 and 6/13/22 for worsening anxiety and prescribed hydroxyzine with improvement (“more calm with it”), regular marijuana user, PMH hearing impaired R ear, no reported PPH, saw therapist “for a few sessions” at unknown organization at 17 y/o but discontinued treatment due to “I didn’t feel comfortable there,” has never before seen psych, currently employed full time at [employer] 1 year, currently domiciled with mother, stepfather (who raised him from birth), and 3 ½  sisters (15 y/o F, 10 y/o F, 9 y/o F) in private apartment (limited relationship with biological father). As per [employer], Mickey originally contacted [facility] OPD 12/2021 and endorsed substance use at that time and was referred to [facility] but Mickey did not follow-up. When asked, Mickey states he last used unprescribed Xanax, Percocet, and acid recreationally 2 years ago, denies any current use (notes while on acid saw colors/lights that has continued and worried about permanency, encouraged to follow up with eye doctor?). Mickey reports “extreme anxiety and anger issues,” describing anxiety as excessive worry, worsening when in social situations, “I overthink a lot and the more I overthink I end up in a bad place in my mind and it’s difficult to be around people,” resulting in self-isolation. Panic episodes once per week described as difficulty breathing, racing thoughts, shakiness, and “felt like I couldn’t walk,” most recent episode 6/28/22. Mickey reports current mild depressed mood that has improved over the last 2 weeks, decreased appetite, and difficulty sleeping (works night shifts). Mickey notes he broke up in his girlfriend 5/2022 and at that time experienced worsening decreased appetite, 20 lbs unintentional weight loss, excessive crying, loss of motivation “difficulty going to work for 2 weeks,” and hopelessness that has since improved due to getting back together recently. (+) recent passive SI “I had thoughts of wanting to hurt myself, I wish I didn’t wake up,” “it’s when I feel like my life isn’t going anywhere,” none currently, no intent or plan, no hx of SA. Mickey reports episodes of difficulty with emotional regulation “when I don’t know how to handle a situation,” specifically high stress situations, described as “irritability, explosiveness, and frustration” resulting in physical aggression towards property, no HI. In 2021 he injured his hand by punching a piece of metal. No IPP admissions.

## 2023-05-22 NOTE — DISCUSSION/SUMMARY
[Initial Plan] : Initial Plan [Adherent to treatment recommendations] : adherent to treatment recommendations [Articulate] : articulate [Cognitively intact] : cognitively intact [Motivated to participate in treatment] : motivated to participate in treatment [Motivated to maintain or improve physical health] : motivated to maintain or improve physical health [Part of a supportive family] : part of a supportive family [Housing stability] : housing stability [English fluency] : English fluency [Connected to healthcare] : connected to healthcare [Social supports] : social supports [FreeTextEntry2] : 2/15/23 [FreeTextEntry3] : 7/20/22 [FreeTextEntry8] : None  [FreeTextEntry9] : None  [de-identified] : None at this time  [Interpersonal Relationships] : Interpersonal Relationships [every ___ months] : every [unfilled] months [Mental Health] : Mental Health [Substance Abuse] : Substance Abuse [Initial] : Initial [Treatment is no longer medically necessary as evidenced by:] : Treatment is no longer medically necessary as evidenced by: [None - Reason others did not participate:] : None - Reason others did not participate:  [Yes] : Yes [Psychiatric Provider/Prescriber] : Psychiatric Provider/Prescriber [Therapist] : Therapist [FreeTextEntry1] : Anxiety including racing thoughts. Patient uses Marijuana to calm himself. He used to use acid.  [FreeTextEntry4] : " I don't want to over think"  [de-identified] : Patient describes  anxiety symptoms which include over thinking, racing thoughts, and excessive worry.  [de-identified] : Patient will identity at least 2 triggers of anxiety including cognitive ones.  [de-identified] : 11/15/22 [de-identified] : Patient wants help in reducing anxiety anxiety.  [de-identified] : Patient will learn at least 2 relaxation methods.  [de-identified] : 11/15/22 [FreeTextEntry5] : Motivational interviewing, Psychoeducation, CBT, and Supportive Individual Therapy [de-identified] : Patient no longer needs therapy and/or medication to function at baseline.  [de-identified] : Not clinically indicated

## 2023-05-22 NOTE — HISTORY OF PRESENT ILLNESS
[FreeTextEntry1] : This is a 22 year old patient who presents for medication management.  The patient reports above mood,  fair sleep and appetite.  The patient denies depression, horace, denies psychosis at this time.  The patient has some increased anxiety that impairs their daily functioning. The patient denies any suicidal ideation, homicidal ideation, no auditory or visual hallucinations, or paranoid ideation.  The patient has no medical complaints. The patient denies any alcohol use, and is not smoking at this time.  He will try to reduce and stop cannabis use. I received the patient's updated physical and labs from their PCP.  Coping skills were discussed and a safety plan was provided.  The patient was educated on the risks, benefits, and alternatives of their psychiatric medications.  The patient will engage in psychotherapy at this time.  The patient consents to ongoing medication management.\par \par Adjust medications.\par \par 2/28:patient called and stated he had a bad panic attack and was crying, risks/benefits discussed, will raise remeron to 15mg at bedtime, patient will call in a few days as needed, and will be seen in 2 weeks, earlier if needed\par 3/13: raise remeron for ongoing depression, anxiety\par 3/29:raise gabapentin for anxiety\par 4/24:raise mirtazapine\par 5/22:raise abilify [No] : no [TextBox_32] : +) recent passive SI “I had thoughts of wanting to hurt myself, I wish I didn’t wake up,” “it’s when I feel like my life isn’t going anywhere,” none current, no intent or plan, no hx of SA. [de-identified] : \par  [None] : none [Responsibility to family or others] : responsibility to family or others [Identifies reasons for living] : identifies reasons for living [Future oriented] : future oriented [Engaged in work or school] : engaged in work or school [TextBox_52] : +) recent passive SI “I had thoughts of wanting to hurt myself, I wish I didn’t wake up,” “it’s when I feel like my life isn’t going anywhere,” none current, no intent or plan, no hx of SA. [None Known] : none known [Yes] : yes [Irritability] : irritability [Residential stability] : residential stability [Relationship stability] : relationship stability [Employment stability] : employment stability [Affective stability] : affective stability

## 2023-05-22 NOTE — REASON FOR VISIT
[Patient preference] : as per patient preference [Telehealth (audio & video) - Individual/Group] : This visit was provided via telehealth using real-time 2-way audio visual technology. [Medical Office: ([facility])___] : The provider was located at the medical office in [unfilled]. [Home] : The patient, [unfilled], was located at home, [unfilled], at the time of the visit. [FreeTextEntry4] : 246pm [FreeTextEntry5] : 301pm [FreeTextEntry1] : "I am still up and down, can we adjust the medication."

## 2023-05-22 NOTE — CURRENT PSYCHIATRIC SYMPTOMS
[Depressed Mood] : no depressed mood [Anhedonia] : no anhedonia [Guilt] : not feeling guilty [Decreased Concentration] : no decrease in concentrating ability [Hyperphagia] : no hyperphagia [Insomnia] : no insomnia disorder [Hypersomnia] : no ~T hypersomnia [Psychomotor Retardation] : no psychomotor retardation [Anorexia] : no anorexia [Euphoria] : no euphoria [Highly Irritable] : no high irritability [Increased Activity] : no increased in activity [Distractibility] : not distracted [Talkativeness] : no talkativeness [Grandeur] : no feelings of grandeur [Buying Sprees] : no buying sprees [Hypersexuality] : denied hypersexuality [Dec Need For Sleep] : no decreased need for sleep [Delusions] : no ~T delusions [Hallucination Visual] : no visual hallucinations [Hallucination Auditory] : no auditory hallucinations [Hallucination Tactile] : no tactile hallucinations [Thought Disorder] : ~T a thought disorder was not noted [Excessive Worry] : excessive worry [Ruminations] : no rumination disorder [Obsessions] : no obsessions [Re-experiencing] : no re-experiencing [Restlessness] : no restlessness [Hypochondriasis] : no hypochondriasis [Panic] : panic  [de-identified] : anxious

## 2023-05-22 NOTE — SOCIAL HISTORY
[With Family] : lives with family [Employed] : employed [Never ] : never  [High School] : high school [None] : none [FreeTextEntry2] : full time at [employer] 1 year [Yes] : yes [TextBox_7] : social drinker, on special occasions [FreeTextEntry1] : regular marijuana use [FreeTextEntry9] : unprescribed Percocet recreationally, last use 2020 [TextBox_52] : unprescribed Xanax recreationally, last use 2020 [TextBox_62] : acid, last use 2020

## 2023-05-22 NOTE — REASON FOR VISIT
[Patient preference] : as per patient preference [Telehealth (audio & video) - Individual/Group] : This visit was provided via telehealth using real-time 2-way audio visual technology. [Medical Office: ([facility])___] : The provider was located at the medical office in [unfilled]. [Home] : The patient, [unfilled], was located at home, [unfilled], at the time of the visit. [FreeTextEntry4] : 246pm [FreeTextEntry5] : 301pm [FreeTextEntry1] : "I am still up and down, can we adjust the medication."

## 2023-05-22 NOTE — DISCUSSION/SUMMARY
[Initial Plan] : Initial Plan [Adherent to treatment recommendations] : adherent to treatment recommendations [Articulate] : articulate [Cognitively intact] : cognitively intact [Motivated to participate in treatment] : motivated to participate in treatment [Motivated to maintain or improve physical health] : motivated to maintain or improve physical health [Part of a supportive family] : part of a supportive family [Housing stability] : housing stability [English fluency] : English fluency [Connected to healthcare] : connected to healthcare [Social supports] : social supports [FreeTextEntry2] : 2/15/23 [FreeTextEntry3] : 7/20/22 [FreeTextEntry8] : None  [FreeTextEntry9] : None  [de-identified] : None at this time  [Interpersonal Relationships] : Interpersonal Relationships [every ___ months] : every [unfilled] months [Mental Health] : Mental Health [Substance Abuse] : Substance Abuse [Initial] : Initial [Treatment is no longer medically necessary as evidenced by:] : Treatment is no longer medically necessary as evidenced by: [None - Reason others did not participate:] : None - Reason others did not participate:  [Yes] : Yes [Psychiatric Provider/Prescriber] : Psychiatric Provider/Prescriber [Therapist] : Therapist [FreeTextEntry1] : Anxiety including racing thoughts. Patient uses Marijuana to calm himself. He used to use acid.  [FreeTextEntry4] : " I don't want to over think"  [de-identified] : Patient describes  anxiety symptoms which include over thinking, racing thoughts, and excessive worry.  [de-identified] : Patient will identity at least 2 triggers of anxiety including cognitive ones.  [de-identified] : 11/15/22 [de-identified] : Patient wants help in reducing anxiety anxiety.  [de-identified] : Patient will learn at least 2 relaxation methods.  [de-identified] : 11/15/22 [FreeTextEntry5] : Motivational interviewing, Psychoeducation, CBT, and Supportive Individual Therapy [de-identified] : Patient no longer needs therapy and/or medication to function at baseline.  [de-identified] : Not clinically indicated

## 2023-05-22 NOTE — PAST MEDICAL HISTORY
[FreeTextEntry1] : 20 y/o single M referred by ED following ED visits 6/2/22 and 6/13/22 for worsening anxiety and prescribed hydroxyzine with improvement (“more calm with it”), regular marijuana user, PMH hearing impaired R ear, no reported PPH, saw therapist “for a few sessions” at unknown organization at 19 y/o but discontinued treatment due to “I didn’t feel comfortable there,” has never before seen psych, currently employed full time at [employer] 1 year, currently domiciled with mother, stepfather (who raised him from birth), and 3 ½  sisters (17 y/o F, 10 y/o F, 9 y/o F) in private apartment (limited relationship with biological father). As per [employer], Mickey originally contacted [facility] OPD 12/2021 and endorsed substance use at that time and was referred to [facility] but Mickey did not follow-up. When asked, Mickey states he last used unprescribed Xanax, Percocet, and acid recreationally 2 years ago, denies any current use (notes while on acid saw colors/lights that has continued and worried about permanency, encouraged to follow up with eye doctor?). Mickey reports “extreme anxiety and anger issues,” describing anxiety as excessive worry, worsening when in social situations, “I overthink a lot and the more I overthink I end up in a bad place in my mind and it’s difficult to be around people,” resulting in self-isolation. Panic episodes once per week described as difficulty breathing, racing thoughts, shakiness, and “felt like I couldn’t walk,” most recent episode 6/28/22. Mickey reports current mild depressed mood that has improved over the last 2 weeks, decreased appetite, and difficulty sleeping (works night shifts). Mickey notes he broke up in his girlfriend 5/2022 and at that time experienced worsening decreased appetite, 20 lbs unintentional weight loss, excessive crying, loss of motivation “difficulty going to work for 2 weeks,” and hopelessness that has since improved due to getting back together recently. (+) recent passive SI “I had thoughts of wanting to hurt myself, I wish I didn’t wake up,” “it’s when I feel like my life isn’t going anywhere,” none currently, no intent or plan, no hx of SA. Mickey reports episodes of difficulty with emotional regulation “when I don’t know how to handle a situation,” specifically high stress situations, described as “irritability, explosiveness, and frustration” resulting in physical aggression towards property, no HI. In 2021 he injured his hand by punching a piece of metal. No IPP admissions.

## 2023-05-22 NOTE — CURRENT PSYCHIATRIC SYMPTOMS
[Depressed Mood] : no depressed mood [Anhedonia] : no anhedonia [Guilt] : not feeling guilty [Decreased Concentration] : no decrease in concentrating ability [Hyperphagia] : no hyperphagia [Insomnia] : no insomnia disorder [Hypersomnia] : no ~T hypersomnia [Psychomotor Retardation] : no psychomotor retardation [Anorexia] : no anorexia [Euphoria] : no euphoria [Highly Irritable] : no high irritability [Increased Activity] : no increased in activity [Distractibility] : not distracted [Talkativeness] : no talkativeness [Grandeur] : no feelings of grandeur [Buying Sprees] : no buying sprees [Hypersexuality] : denied hypersexuality [Dec Need For Sleep] : no decreased need for sleep [Delusions] : no ~T delusions [Hallucination Visual] : no visual hallucinations [Hallucination Auditory] : no auditory hallucinations [Hallucination Tactile] : no tactile hallucinations [Thought Disorder] : ~T a thought disorder was not noted [Excessive Worry] : excessive worry [Ruminations] : no rumination disorder [Obsessions] : no obsessions [Re-experiencing] : no re-experiencing [Restlessness] : no restlessness [Hypochondriasis] : no hypochondriasis [Panic] : panic  [de-identified] : anxious

## 2023-05-22 NOTE — PAST MEDICAL HISTORY
[FreeTextEntry1] : 20 y/o single M referred by ED following ED visits 6/2/22 and 6/13/22 for worsening anxiety and prescribed hydroxyzine with improvement (“more calm with it”), regular marijuana user, PMH hearing impaired R ear, no reported PPH, saw therapist “for a few sessions” at unknown organization at 19 y/o but discontinued treatment due to “I didn’t feel comfortable there,” has never before seen psych, currently employed full time at [employer] 1 year, currently domiciled with mother, stepfather (who raised him from birth), and 3 ½  sisters (15 y/o F, 10 y/o F, 9 y/o F) in private apartment (limited relationship with biological father). As per [employer], Mickey originally contacted [facility] OPD 12/2021 and endorsed substance use at that time and was referred to [facility] but Mickey did not follow-up. When asked, Mickey states he last used unprescribed Xanax, Percocet, and acid recreationally 2 years ago, denies any current use (notes while on acid saw colors/lights that has continued and worried about permanency, encouraged to follow up with eye doctor?). Mickey reports “extreme anxiety and anger issues,” describing anxiety as excessive worry, worsening when in social situations, “I overthink a lot and the more I overthink I end up in a bad place in my mind and it’s difficult to be around people,” resulting in self-isolation. Panic episodes once per week described as difficulty breathing, racing thoughts, shakiness, and “felt like I couldn’t walk,” most recent episode 6/28/22. Mickey reports current mild depressed mood that has improved over the last 2 weeks, decreased appetite, and difficulty sleeping (works night shifts). Mickey notes he broke up in his girlfriend 5/2022 and at that time experienced worsening decreased appetite, 20 lbs unintentional weight loss, excessive crying, loss of motivation “difficulty going to work for 2 weeks,” and hopelessness that has since improved due to getting back together recently. (+) recent passive SI “I had thoughts of wanting to hurt myself, I wish I didn’t wake up,” “it’s when I feel like my life isn’t going anywhere,” none currently, no intent or plan, no hx of SA. Mickey reports episodes of difficulty with emotional regulation “when I don’t know how to handle a situation,” specifically high stress situations, described as “irritability, explosiveness, and frustration” resulting in physical aggression towards property, no HI. In 2021 he injured his hand by punching a piece of metal. No IPP admissions.

## 2023-05-23 DIAGNOSIS — F41.1 GENERALIZED ANXIETY DISORDER: ICD-10-CM

## 2023-05-23 DIAGNOSIS — F34.0 CYCLOTHYMIC DISORDER: ICD-10-CM

## 2023-05-23 DIAGNOSIS — G47.00 INSOMNIA, UNSPECIFIED: ICD-10-CM

## 2023-06-26 ENCOUNTER — OUTPATIENT (OUTPATIENT)
Dept: OUTPATIENT SERVICES | Facility: HOSPITAL | Age: 23
LOS: 1 days | End: 2023-06-26
Payer: MEDICAID

## 2023-06-26 ENCOUNTER — APPOINTMENT (OUTPATIENT)
Dept: PSYCHIATRY | Facility: CLINIC | Age: 23
End: 2023-06-26
Payer: MEDICAID

## 2023-06-26 DIAGNOSIS — F41.1 GENERALIZED ANXIETY DISORDER: ICD-10-CM

## 2023-06-26 DIAGNOSIS — G47.00 INSOMNIA, UNSPECIFIED: ICD-10-CM

## 2023-06-26 PROCEDURE — 99214 OFFICE O/P EST MOD 30 MIN: CPT | Mod: 95

## 2023-06-26 NOTE — DISCUSSION/SUMMARY
[Date of Last Physical Exam: _____] : Date of Last Physical Exam: [unfilled] [Date of Last Annual Labs: _____] : Date of Last Annual Labs: [unfilled] [Annual Review of Systems Completed?] : Annual Review of Systems Completed: Yes [Tobacco Screening Completed?] : Tobacco Screening Completed: Yes [Date of Last AIMS: _____] : Date of Last AIMS: [unfilled] [Date of Last HbgA1c: _____] : Date of Last HbgA1c: [unfilled] [Date of Last Lipid Profile: _____] : Date of Last Lipid Profile: [unfilled] [Potential impact of patient’s physical health conditions on psychiatric care?] : Potential impact of patient’s physical health conditions on psychiatric care: No [Does patient require any additional health services or referrals?] : Does patient require any additional health services or referrals: No [FreeTextEntry1] : Patient has less depression, ongoing anxiety\par Continue medication:\par 1. gabapentin 600mg po TID\par 2. abilify 5mg po daily, raise to 7mg po daily\par 3. mirtazapine 45mg at bedtime\par \par Follow up in 4-5 weeks, earlier as needed

## 2023-06-26 NOTE — SOCIAL HISTORY
[With Family] : lives with family [Employed] : employed [Never ] : never  [High School] : high school [None] : none [FreeTextEntry2] : full time at [employer] 1 year [Yes] : yes [TextBox_7] : social drinker, on special occasions [FreeTextEntry1] : regular marijuana use [FreeTextEntry9] : unprescribed Percocet recreationally, last use 2020 [TextBox_52] : unprescribed Xanax recreationally, last use 2020 [TextBox_62] : acid, last use 2020

## 2023-06-26 NOTE — REASON FOR VISIT
[Patient preference] : as per patient preference [Telehealth (audio & video) - Individual/Group] : This visit was provided via telehealth using real-time 2-way audio visual technology. [Medical Office: ([facility])___] : The provider was located at the medical office in [unfilled]. [Home] : The patient, [unfilled], was located at home, [unfilled], at the time of the visit. [FreeTextEntry4] : 301pm [FreeTextEntry5] : 316pm [FreeTextEntry1] : "I am better, want to continue current medications."

## 2023-06-26 NOTE — CURRENT PSYCHIATRIC SYMPTOMS
[Depressed Mood] : no depressed mood [Anhedonia] : no anhedonia [Guilt] : not feeling guilty [Decreased Concentration] : no decrease in concentrating ability [Hyperphagia] : no hyperphagia [Insomnia] : no insomnia disorder [Hypersomnia] : no ~T hypersomnia [Psychomotor Retardation] : no psychomotor retardation [Anorexia] : no anorexia [Euphoria] : no euphoria [Highly Irritable] : no high irritability [Increased Activity] : no increased in activity [Distractibility] : not distracted [Talkativeness] : no talkativeness [Grandeur] : no feelings of grandeur [Buying Sprees] : no buying sprees [Hypersexuality] : denied hypersexuality [Dec Need For Sleep] : no decreased need for sleep [Delusions] : no ~T delusions [Hallucination Visual] : no visual hallucinations [Hallucination Auditory] : no auditory hallucinations [Hallucination Tactile] : no tactile hallucinations [Thought Disorder] : ~T a thought disorder was not noted [Excessive Worry] : excessive worry [Ruminations] : no rumination disorder [Obsessions] : no obsessions [Re-experiencing] : no re-experiencing [Restlessness] : no restlessness [Hypochondriasis] : no hypochondriasis [Panic] : panic  [de-identified] : anxious

## 2023-06-26 NOTE — HISTORY OF PRESENT ILLNESS
[FreeTextEntry1] : This is a 22 year old patient who presents for medication management.  The patient reports fairly stable mood,  fair sleep and appetite.  The patient denies depression, horace, denies psychosis at this time.  The patient has less increased anxiety that impairs their daily functioning. The patient denies any suicidal ideation, homicidal ideation, no auditory or visual hallucinations, or paranoid ideation.  The patient has no medical complaints. The patient denies any alcohol use, and is not smoking at this time.  He will try to reduce and stop cannabis use. I received the patient's updated physical and labs from their PCP.  Coping skills were discussed and a safety plan was provided.  The patient was educated on the risks, benefits, and alternatives of their psychiatric medications.  The patient will engage in psychotherapy at this time.  The patient consents to ongoing medication management.\par \par Adjust medications.\par \par 2/28:patient called and stated he had a bad panic attack and was crying, risks/benefits discussed, will raise remeron to 15mg at bedtime, patient will call in a few days as needed, and will be seen in 2 weeks, earlier if needed\par 3/13: raise remeron for ongoing depression, anxiety\par 3/29:raise gabapentin for anxiety\par 4/24:raise mirtazapine\par 5/22:raise abilify\par 6/26:improved, continue current medication [No] : no [TextBox_32] : +) recent passive SI “I had thoughts of wanting to hurt myself, I wish I didn’t wake up,” “it’s when I feel like my life isn’t going anywhere,” none current, no intent or plan, no hx of SA. [de-identified] : \par  [None] : none [Responsibility to family or others] : responsibility to family or others [Identifies reasons for living] : identifies reasons for living [Future oriented] : future oriented [Engaged in work or school] : engaged in work or school [TextBox_52] : +) recent passive SI “I had thoughts of wanting to hurt myself, I wish I didn’t wake up,” “it’s when I feel like my life isn’t going anywhere,” none current, no intent or plan, no hx of SA. [None Known] : none known [Yes] : yes [Irritability] : irritability [Residential stability] : residential stability [Relationship stability] : relationship stability [Employment stability] : employment stability [Affective stability] : affective stability

## 2023-06-26 NOTE — PAST MEDICAL HISTORY
[FreeTextEntry1] : 20 y/o single M referred by ED following ED visits 6/2/22 and 6/13/22 for worsening anxiety and prescribed hydroxyzine with improvement (“more calm with it”), regular marijuana user, PMH hearing impaired R ear, no reported PPH, saw therapist “for a few sessions” at unknown organization at 17 y/o but discontinued treatment due to “I didn’t feel comfortable there,” has never before seen psych, currently employed full time at [employer] 1 year, currently domiciled with mother, stepfather (who raised him from birth), and 3 ½  sisters (15 y/o F, 10 y/o F, 9 y/o F) in private apartment (limited relationship with biological father). As per [employer], Mickey originally contacted [facility] OPD 12/2021 and endorsed substance use at that time and was referred to [facility] but Mickey did not follow-up. When asked, Mickey states he last used unprescribed Xanax, Percocet, and acid recreationally 2 years ago, denies any current use (notes while on acid saw colors/lights that has continued and worried about permanency, encouraged to follow up with eye doctor?). Mickey reports “extreme anxiety and anger issues,” describing anxiety as excessive worry, worsening when in social situations, “I overthink a lot and the more I overthink I end up in a bad place in my mind and it’s difficult to be around people,” resulting in self-isolation. Panic episodes once per week described as difficulty breathing, racing thoughts, shakiness, and “felt like I couldn’t walk,” most recent episode 6/28/22. Mickey reports current mild depressed mood that has improved over the last 2 weeks, decreased appetite, and difficulty sleeping (works night shifts). Mickey notes he broke up in his girlfriend 5/2022 and at that time experienced worsening decreased appetite, 20 lbs unintentional weight loss, excessive crying, loss of motivation “difficulty going to work for 2 weeks,” and hopelessness that has since improved due to getting back together recently. (+) recent passive SI “I had thoughts of wanting to hurt myself, I wish I didn’t wake up,” “it’s when I feel like my life isn’t going anywhere,” none currently, no intent or plan, no hx of SA. Mickey reports episodes of difficulty with emotional regulation “when I don’t know how to handle a situation,” specifically high stress situations, described as “irritability, explosiveness, and frustration” resulting in physical aggression towards property, no HI. In 2021 he injured his hand by punching a piece of metal. No IPP admissions.

## 2023-06-26 NOTE — PLAN
[FreeTextEntry4] : mood is improved\par \par anxiety is less\par \par health is good, will update physical and labs\par \par working and going to school\par \par elected to d/c therapy

## 2023-06-27 DIAGNOSIS — G47.00 INSOMNIA, UNSPECIFIED: ICD-10-CM

## 2023-06-27 DIAGNOSIS — F41.1 GENERALIZED ANXIETY DISORDER: ICD-10-CM

## 2023-08-02 ENCOUNTER — APPOINTMENT (OUTPATIENT)
Dept: PSYCHIATRY | Facility: CLINIC | Age: 23
End: 2023-08-02
Payer: MEDICAID

## 2023-08-02 ENCOUNTER — OUTPATIENT (OUTPATIENT)
Dept: OUTPATIENT SERVICES | Facility: HOSPITAL | Age: 23
LOS: 1 days | End: 2023-08-02
Payer: MEDICAID

## 2023-08-02 DIAGNOSIS — F34.0 CYCLOTHYMIC DISORDER: ICD-10-CM

## 2023-08-02 DIAGNOSIS — F41.1 GENERALIZED ANXIETY DISORDER: ICD-10-CM

## 2023-08-02 DIAGNOSIS — G47.00 INSOMNIA, UNSPECIFIED: ICD-10-CM

## 2023-08-02 PROCEDURE — 99214 OFFICE O/P EST MOD 30 MIN: CPT | Mod: 95

## 2023-08-02 RX ORDER — ARIPIPRAZOLE 2 MG/1
2 TABLET ORAL
Qty: 30 | Refills: 2 | Status: DISCONTINUED | COMMUNITY
Start: 2023-05-22 | End: 2023-08-02

## 2023-08-02 NOTE — PLAN
[FreeTextEntry4] : mood is is a little down  anxiety is less  health is good, will update physical and labs  working and going to school  elected to d/c therapy

## 2023-08-02 NOTE — HISTORY OF PRESENT ILLNESS
[No] : no [None] : none [Responsibility to family or others] : responsibility to family or others [Identifies reasons for living] : identifies reasons for living [Future oriented] : future oriented [Engaged in work or school] : engaged in work or school [None Known] : none known [Yes] : yes [Irritability] : irritability [Residential stability] : residential stability [Relationship stability] : relationship stability [Employment stability] : employment stability [Affective stability] : affective stability [FreeTextEntry1] : This is a 22 year old patient who presents for medication management.  The patient reports some depressed mood,  fair sleep and appetite.  The patient denies horace, denies psychosis at this time.  The patient has less increased anxiety that impairs their daily functioning. The patient denies any suicidal ideation, homicidal ideation, no auditory or visual hallucinations, or paranoid ideation.  The patient has no medical complaints. The patient denies any alcohol use, and is not smoking at this time.  He will try to reduce and stop cannabis use. I received the patient's updated physical and labs from their PCP.  Coping skills were discussed and a safety plan was provided.  The patient was educated on the risks, benefits, and alternatives of their psychiatric medications.  The patient will engage in psychotherapy at this time.  The patient consents to ongoing medication management.  Adjust medications.  2/28:patient called and stated he had a bad panic attack and was crying, risks/benefits discussed, will raise remeron to 15mg at bedtime, patient will call in a few days as needed, and will be seen in 2 weeks, earlier if needed 3/13: raise remeron for ongoing depression, anxiety 3/29:raise gabapentin for anxiety 4/24:raise mirtazapine 5/22:raise abilify 6/26:improved, continue current medication 8/2:raise abilify and add prazosin.  [TextBox_32] : +) recent passive SI "I had thoughts of wanting to hurt myself, I wish I didn't wake up," "it's when I feel like my life isn't going anywhere," none current, no intent or plan, no hx of SA. [de-identified] : \par   [TextBox_52] : +) recent passive SI "I had thoughts of wanting to hurt myself, I wish I didn't wake up," "it's when I feel like my life isn't going anywhere," none current, no intent or plan, no hx of SA.

## 2023-08-02 NOTE — FAMILY HISTORY
[FreeTextEntry1] : Family composition: never , no children\par  Family history and background: raised by mother and stepfather (stepdad since birth), mother and biological father  before his birth, limited relationship with biological father "we talk rarely," 3   sisters (15 y/o F, 10 y/o F, 7 y/o F) \par  Family relationship: supportive/stressful with mother and stepfather, supportive relationships with 1/s sisters "I love them, they're just sister, we're good" \par  Pertinent Family Medical, MH and Substance Use History including Adult Child of Alcoholic and child of substance abuse status; history of cancer and heart disease\par  *feels like mental health hx in family but undiagnosed\par  Step-father - alcohol abuse, not completely sober but has decreased alcohol use recently\par

## 2023-08-02 NOTE — CURRENT PSYCHIATRIC SYMPTOMS
[Excessive Worry] : excessive worry [Panic] : panic  [Depressed Mood] : no depressed mood [Anhedonia] : no anhedonia [Guilt] : not feeling guilty [Decreased Concentration] : no decrease in concentrating ability [Hyperphagia] : no hyperphagia [Insomnia] : no insomnia disorder [Hypersomnia] : no ~T hypersomnia [Psychomotor Retardation] : no psychomotor retardation [Anorexia] : no anorexia [Euphoria] : no euphoria [Highly Irritable] : no high irritability [Increased Activity] : no increased in activity [Distractibility] : not distracted [Talkativeness] : no talkativeness [Grandeur] : no feelings of grandeur [Buying Sprees] : no buying sprees [Hypersexuality] : denied hypersexuality [Dec Need For Sleep] : no decreased need for sleep [Delusions] : no ~T delusions [Hallucination Visual] : no visual hallucinations [Hallucination Auditory] : no auditory hallucinations [Hallucination Tactile] : no tactile hallucinations [Thought Disorder] : ~T a thought disorder was not noted [Ruminations] : no rumination disorder [Obsessions] : no obsessions [Re-experiencing] : no re-experiencing [Restlessness] : no restlessness [Hypochondriasis] : no hypochondriasis [de-identified] : anxious

## 2023-08-02 NOTE — DISCUSSION/SUMMARY
[Date of Last Physical Exam: _____] : Date of Last Physical Exam: [unfilled] [Date of Last Annual Labs: _____] : Date of Last Annual Labs: [unfilled] [Annual Review of Systems Completed?] : Annual Review of Systems Completed: Yes [Tobacco Screening Completed?] : Tobacco Screening Completed: Yes [Date of Last AIMS: _____] : Date of Last AIMS: [unfilled] [Date of Last HbgA1c: _____] : Date of Last HbgA1c: [unfilled] [Date of Last Lipid Profile: _____] : Date of Last Lipid Profile: [unfilled] [Potential impact of patientâ??s physical health conditions on psychiatric care?] : Potential impact of patientâ??s physical health conditions on psychiatric care: No [Does patient require any additional health services or referrals?] : Does patient require any additional health services or referrals: No [FreeTextEntry1] : Patient has less depression, ongoing anxiety Continue medication: 1. gabapentin 600mg po TID 2. abilify 7mg po daily, raise to 10mg po daily 3. mirtazapine 45mg at bedtime 4. add prazosin 1mg at bedtime  Follow up in 4 weeks, earlier as needed

## 2023-08-02 NOTE — REASON FOR VISIT
[Patient preference] : as per patient preference [Telehealth (audio & video) - Individual/Group] : This visit was provided via telehealth using real-time 2-way audio visual technology. [Medical Office: ([facility])___] : The provider was located at the medical office in [unfilled]. [Home] : The patient, [unfilled], was located at home, [unfilled], at the time of the visit. [FreeTextEntry4] : 245pm [FreeTextEntry5] : 300pm [FreeTextEntry3] : phone, then video [FreeTextEntry1] : "I am still depressed at times and have nightmares."

## 2023-08-02 NOTE — SOCIAL HISTORY
[With Family] : lives with family [Employed] : employed [Never ] : never  [High School] : high school [None] : none [Yes] : yes [FreeTextEntry2] : full time at [employer] 1 year [TextBox_7] : social drinker, on special occasions [FreeTextEntry1] : regular marijuana use [FreeTextEntry9] : unprescribed Percocet recreationally, last use 2020 [TextBox_52] : unprescribed Xanax recreationally, last use 2020 [TextBox_62] : acid, last use 2020

## 2023-08-02 NOTE — PAST MEDICAL HISTORY
[FreeTextEntry1] : 22 y/o single M referred by ED following ED visits 6/2/22 and 6/13/22 for worsening anxiety and prescribed hydroxyzine with improvement ("more calm with it"), regular marijuana user, PMH hearing impaired R ear, no reported PPH, saw therapist "for a few sessions" at unknown organization at 17 y/o but discontinued treatment due to "I didn't feel comfortable there," has never before seen psych, currently employed full time at [employer] 1 year, currently domiciled with mother, stepfather (who raised him from birth), and 3   sisters (15 y/o F, 10 y/o F, 9 y/o F) in private apartment (limited relationship with biological father). As per [employer], Mickey originally contacted [facility] 12/2021 and endorsed substance use at that time and was referred to [facility] but Mickey did not follow-up. When asked, Mickey states he last used unprescribed Xanax, Percocet, and acid recreationally 2 years ago, denies any current use (notes while on acid saw colors/lights that has continued and worried about permanency, encouraged to follow up with eye doctor?). Mickey reports "extreme anxiety and anger issues," describing anxiety as excessive worry, worsening when in social situations, "I overthink a lot and the more I overthink I end up in a bad place in my mind and it's difficult to be around people," resulting in self-isolation. Panic episodes once per week described as difficulty breathing, racing thoughts, shakiness, and "felt like I couldn't walk," most recent episode 6/28/22. Mickey reports current mild depressed mood that has improved over the last 2 weeks, decreased appetite, and difficulty sleeping (works night shifts). Mickey notes he broke up in his girlfriend 5/2022 and at that time experienced worsening decreased appetite, 20 lbs unintentional weight loss, excessive crying, loss of motivation "difficulty going to work for 2 weeks," and hopelessness that has since improved due to getting back together recently. (+) recent passive SI "I had thoughts of wanting to hurt myself, I wish I didn't wake up," "it's when I feel like my life isn't going anywhere," none currently, no intent or plan, no hx of SA. Mickey reports episodes of difficulty with emotional regulation "when I don't know how to handle a situation," specifically high stress situations, described as "irritability, explosiveness, and frustration" resulting in physical aggression towards property, no HI. In 2021 he injured his hand by punching a piece of metal. No IPP admissions.

## 2023-08-03 DIAGNOSIS — F34.0 CYCLOTHYMIC DISORDER: ICD-10-CM

## 2023-08-03 DIAGNOSIS — F41.1 GENERALIZED ANXIETY DISORDER: ICD-10-CM

## 2023-08-03 DIAGNOSIS — G47.00 INSOMNIA, UNSPECIFIED: ICD-10-CM

## 2023-08-29 ENCOUNTER — APPOINTMENT (OUTPATIENT)
Dept: PSYCHIATRY | Facility: CLINIC | Age: 23
End: 2023-08-29
Payer: MEDICAID

## 2023-08-29 ENCOUNTER — OUTPATIENT (OUTPATIENT)
Dept: OUTPATIENT SERVICES | Facility: HOSPITAL | Age: 23
LOS: 1 days | End: 2023-08-29
Payer: MEDICAID

## 2023-08-29 DIAGNOSIS — F41.1 GENERALIZED ANXIETY DISORDER: ICD-10-CM

## 2023-08-29 DIAGNOSIS — F34.0 CYCLOTHYMIC DISORDER: ICD-10-CM

## 2023-08-29 DIAGNOSIS — G47.00 INSOMNIA, UNSPECIFIED: ICD-10-CM

## 2023-08-29 PROCEDURE — 99214 OFFICE O/P EST MOD 30 MIN: CPT | Mod: 95

## 2023-08-29 NOTE — PAST MEDICAL HISTORY
[FreeTextEntry1] : 20 y/o single M referred by ED following ED visits 6/2/22 and 6/13/22 for worsening anxiety and prescribed hydroxyzine with improvement ("more calm with it"), regular marijuana user, PMH hearing impaired R ear, no reported PPH, saw therapist "for a few sessions" at unknown organization at 19 y/o but discontinued treatment due to "I didn't feel comfortable there," has never before seen psych, currently employed full time at [employer] 1 year, currently domiciled with mother, stepfather (who raised him from birth), and 3   sisters (17 y/o F, 10 y/o F, 7 y/o F) in private apartment (limited relationship with biological father). As per [employer], Mickey originally contacted [facility] OPD 12/2021 and endorsed substance use at that time and was referred to [facility] but Mickey did not follow-up. When asked, Mickey states he last used unprescribed Xanax, Percocet, and acid recreationally 2 years ago, denies any current use (notes while on acid saw colors/lights that has continued and worried about permanency, encouraged to follow up with eye doctor?). Mickey reports "extreme anxiety and anger issues," describing anxiety as excessive worry, worsening when in social situations, "I overthink a lot and the more I overthink I end up in a bad place in my mind and it's difficult to be around people," resulting in self-isolation. Panic episodes once per week described as difficulty breathing, racing thoughts, shakiness, and "felt like I couldn't walk," most recent episode 6/28/22. Mickey reports current mild depressed mood that has improved over the last 2 weeks, decreased appetite, and difficulty sleeping (works night shifts). Mickey notes he broke up in his girlfriend 5/2022 and at that time experienced worsening decreased appetite, 20 lbs unintentional weight loss, excessive crying, loss of motivation "difficulty going to work for 2 weeks," and hopelessness that has since improved due to getting back together recently. (+) recent passive SI "I had thoughts of wanting to hurt myself, I wish I didn't wake up," "it's when I feel like my life isn't going anywhere," none currently, no intent or plan, no hx of SA. Mickey reports episodes of difficulty with emotional regulation "when I don't know how to handle a situation," specifically high stress situations, described as "irritability, explosiveness, and frustration" resulting in physical aggression towards property, no HI. In 2021 he injured his hand by punching a piece of metal. No IPP admissions.

## 2023-08-29 NOTE — DISCUSSION/SUMMARY
[Date of Last Physical Exam: _____] : Date of Last Physical Exam: [unfilled] [Date of Last Annual Labs: _____] : Date of Last Annual Labs: [unfilled] [Annual Review of Systems Completed?] : Annual Review of Systems Completed: Yes [Tobacco Screening Completed?] : Tobacco Screening Completed: Yes [Date of Last AIMS: _____] : Date of Last AIMS: [unfilled] [Date of Last HbgA1c: _____] : Date of Last HbgA1c: [unfilled] [Date of Last Lipid Profile: _____] : Date of Last Lipid Profile: [unfilled] [Potential impact of patientâ??s physical health conditions on psychiatric care?] : Potential impact of patientâ??s physical health conditions on psychiatric care: No [Does patient require any additional health services or referrals?] : Does patient require any additional health services or referrals: No [FreeTextEntry1] : Patient has less depression, less anxiety, and improved sleep Continue medication: 1. gabapentin 600mg po TID 2. abilify 10mg po daily 3. mirtazapine 45mg at bedtime 4. prazosin 1mg at bedtime  Follow up in 4 weeks, earlier as needed

## 2023-08-29 NOTE — REASON FOR VISIT
[Patient preference] : as per patient preference [Telehealth (audio & video) - Individual/Group] : This visit was provided via telehealth using real-time 2-way audio visual technology. [Medical Office: ([facility])___] : The provider was located at the medical office in [unfilled]. [Home] : The patient, [unfilled], was located at home, [unfilled], at the time of the visit. [FreeTextEntry4] : 318pm [FreeTextEntry5] : 334pm [FreeTextEntry3] : phone, then video [FreeTextEntry1] : "I am better."

## 2023-08-29 NOTE — CURRENT PSYCHIATRIC SYMPTOMS
[Excessive Worry] : excessive worry [Panic] : panic  [Depressed Mood] : no depressed mood [Anhedonia] : no anhedonia [Guilt] : not feeling guilty [Decreased Concentration] : no decrease in concentrating ability [Hyperphagia] : no hyperphagia [Insomnia] : no insomnia disorder [Hypersomnia] : no ~T hypersomnia [Psychomotor Retardation] : no psychomotor retardation [Anorexia] : no anorexia [Euphoria] : no euphoria [Highly Irritable] : no high irritability [Increased Activity] : no increased in activity [Distractibility] : not distracted [Talkativeness] : no talkativeness [Grandeur] : no feelings of grandeur [Buying Sprees] : no buying sprees [Hypersexuality] : denied hypersexuality [Dec Need For Sleep] : no decreased need for sleep [Delusions] : no ~T delusions [Hallucination Visual] : no visual hallucinations [Hallucination Auditory] : no auditory hallucinations [Hallucination Tactile] : no tactile hallucinations [Thought Disorder] : ~T a thought disorder was not noted [Ruminations] : no rumination disorder [Obsessions] : no obsessions [Re-experiencing] : no re-experiencing [Restlessness] : no restlessness [Hypochondriasis] : no hypochondriasis [de-identified] : anxious

## 2023-08-29 NOTE — PLAN
[FreeTextEntry4] : mood is better  anxiety is less  health is good, will update physical and labs  working and going to school  elected to d/c therapy

## 2023-08-29 NOTE — HISTORY OF PRESENT ILLNESS
[No] : no [None] : none [Responsibility to family or others] : responsibility to family or others [Identifies reasons for living] : identifies reasons for living [Future oriented] : future oriented [Engaged in work or school] : engaged in work or school [None Known] : none known [Yes] : yes [Irritability] : irritability [Residential stability] : residential stability [Relationship stability] : relationship stability [Employment stability] : employment stability [Affective stability] : affective stability [FreeTextEntry1] : This is a 22 year old patient who presents for medication management.  The patient reports improved mood, sleep and appetite.  The patient denies depression, horace, denies psychosis at this time.  The patient has less increased anxiety that impairs their daily functioning. The patient denies any suicidal ideation, homicidal ideation, no auditory or visual hallucinations, or paranoid ideation.  The patient has no medical complaints. The patient denies any alcohol use, and is not smoking at this time.  He will try to reduce and stop cannabis use. I received the patient's updated physical and labs from their PCP.  Coping skills were discussed and a safety plan was provided.  The patient was educated on the risks, benefits, and alternatives of their psychiatric medications.  The patient will engage in psychotherapy at this time.  The patient consents to ongoing medication management.  Continue medications.  2/28:patient called and stated he had a bad panic attack and was crying, risks/benefits discussed, will raise remeron to 15mg at bedtime, patient will call in a few days as needed, and will be seen in 2 weeks, earlier if needed 3/13: raise remeron for ongoing depression, anxiety 3/29:raise gabapentin for anxiety 4/24:raise mirtazapine 5/22:raise abilify 6/26:improved, continue current medication 8/2:raise abilify and add prazosin.  8/29:back to baseline [TextBox_32] : +) recent passive SI "I had thoughts of wanting to hurt myself, I wish I didn't wake up," "it's when I feel like my life isn't going anywhere," none current, no intent or plan, no hx of SA. [de-identified] : \par   [TextBox_52] : +) recent passive SI "I had thoughts of wanting to hurt myself, I wish I didn't wake up," "it's when I feel like my life isn't going anywhere," none current, no intent or plan, no hx of SA.

## 2023-08-29 NOTE — FAMILY HISTORY
[FreeTextEntry1] : Family composition: never , no children\par  Family history and background: raised by mother and stepfather (stepdad since birth), mother and biological father  before his birth, limited relationship with biological father "we talk rarely," 3   sisters (17 y/o F, 10 y/o F, 7 y/o F) \par  Family relationship: supportive/stressful with mother and stepfather, supportive relationships with 1/s sisters "I love them, they're just sister, we're good" \par  Pertinent Family Medical, MH and Substance Use History including Adult Child of Alcoholic and child of substance abuse status; history of cancer and heart disease\par  *feels like mental health hx in family but undiagnosed\par  Step-father - alcohol abuse, not completely sober but has decreased alcohol use recently\par

## 2023-08-29 NOTE — SOCIAL HISTORY
[With Family] : lives with family [Employed] : employed [Never ] : never  [High School] : high school [None] : none [Yes] : yes [FreeTextEntry2] : full time at [employer] 1 year [TextBox_7] : social drinker, on special occasions [FreeTextEntry1] : regular marijuana use [FreeTextEntry9] : unprescribed Percocet recreationally, last use 2020 [TextBox_52] : unprescribed Xanax recreationally, last use 2020 [TextBox_62] : acid, last use 2020

## 2023-08-30 DIAGNOSIS — G47.00 INSOMNIA, UNSPECIFIED: ICD-10-CM

## 2023-08-30 DIAGNOSIS — F34.0 CYCLOTHYMIC DISORDER: ICD-10-CM

## 2023-08-30 DIAGNOSIS — F41.1 GENERALIZED ANXIETY DISORDER: ICD-10-CM

## 2023-09-19 NOTE — ED ADULT NURSE NOTE - NS ED NURSE LEVEL OF CONSCIOUSNESS MENTAL STATUS
SW CM referral received 9/18 from Dr. Bryon Chan r/t pt lack of health insurance. Chart review completed. Per chart review, pt was seen to establish care with [address]  [facility] and complete annual physical exam. Per chart review, pt informed being overdue for screening [employer]. Per chart review, pt does not have insurance and is self-pay at this time. Per chart, pt states he may not be able to pay for blood work out of pocket at this time. Per chart, SW referral to help patient navigate medical costs. OP SWCM called pt using [employer]  #[id number]. OP SWCM introduced self, role and reason for calling with help of . OP SWCM confirmed pt does not have health insurance, but confirmed that pt has [employer] and SFS through the guarantor account. Pt needing assistance to pay for further testing and blood work. OP SWCM discussed [facility] care application with pt. Pt asked that [facility] application be mailed to him. OP SWCM to mail application to pt today, along with contact information to call with any questions.
Awake/Alert

## 2023-09-26 ENCOUNTER — APPOINTMENT (OUTPATIENT)
Dept: PSYCHIATRY | Facility: CLINIC | Age: 23
End: 2023-09-26
Payer: MEDICAID

## 2023-09-26 ENCOUNTER — OUTPATIENT (OUTPATIENT)
Dept: OUTPATIENT SERVICES | Facility: HOSPITAL | Age: 23
LOS: 1 days | End: 2023-09-26
Payer: MEDICAID

## 2023-09-26 DIAGNOSIS — F41.1 GENERALIZED ANXIETY DISORDER: ICD-10-CM

## 2023-09-26 DIAGNOSIS — F34.0 CYCLOTHYMIC DISORDER: ICD-10-CM

## 2023-09-26 PROCEDURE — 99214 OFFICE O/P EST MOD 30 MIN: CPT | Mod: 95

## 2023-09-26 RX ORDER — PRAZOSIN HYDROCHLORIDE 1 MG/1
1 CAPSULE ORAL
Qty: 30 | Refills: 1 | Status: DISCONTINUED | COMMUNITY
Start: 2023-08-02 | End: 2023-09-26

## 2023-09-27 DIAGNOSIS — F41.1 GENERALIZED ANXIETY DISORDER: ICD-10-CM

## 2023-09-27 DIAGNOSIS — F34.0 CYCLOTHYMIC DISORDER: ICD-10-CM

## 2023-10-24 ENCOUNTER — OUTPATIENT (OUTPATIENT)
Dept: OUTPATIENT SERVICES | Facility: HOSPITAL | Age: 23
LOS: 1 days | End: 2023-10-24
Payer: MEDICAID

## 2023-10-24 ENCOUNTER — APPOINTMENT (OUTPATIENT)
Dept: PSYCHIATRY | Facility: CLINIC | Age: 23
End: 2023-10-24
Payer: MEDICAID

## 2023-10-24 DIAGNOSIS — F34.0 CYCLOTHYMIC DISORDER: ICD-10-CM

## 2023-10-24 DIAGNOSIS — F41.1 GENERALIZED ANXIETY DISORDER: ICD-10-CM

## 2023-10-24 PROCEDURE — 99214 OFFICE O/P EST MOD 30 MIN: CPT

## 2023-10-25 DIAGNOSIS — F34.0 CYCLOTHYMIC DISORDER: ICD-10-CM

## 2023-10-25 DIAGNOSIS — F41.1 GENERALIZED ANXIETY DISORDER: ICD-10-CM

## 2023-11-07 NOTE — ED BEHAVIORAL HEALTH ASSESSMENT NOTE - PERCEPTIONS
Patient is s/p C7-T1 ACDF done on 9/26/2023 by Dr. Escobedo. Hx of C5-6, C6-7 ACDF done on 3/15/2023 for C7-T1 disc herniation below C6-7 fusion and C5-6 total disc arthroplasty. Refill for Norco and Diazepam received. He has weaned down to Norco q 8hrs and Diazepam BID. [place].    No abnormalities

## 2023-11-10 ENCOUNTER — EMERGENCY (EMERGENCY)
Facility: HOSPITAL | Age: 23
LOS: 0 days | Discharge: HOME | End: 2023-11-10
Attending: STUDENT IN AN ORGANIZED HEALTH CARE EDUCATION/TRAINING PROGRAM
Payer: MEDICAID

## 2023-11-10 VITALS
HEART RATE: 115 BPM | WEIGHT: 149.91 LBS | RESPIRATION RATE: 21 BRPM | OXYGEN SATURATION: 99 % | TEMPERATURE: 99 F | DIASTOLIC BLOOD PRESSURE: 85 MMHG | SYSTOLIC BLOOD PRESSURE: 133 MMHG

## 2023-11-10 VITALS
OXYGEN SATURATION: 99 % | HEART RATE: 102 BPM | DIASTOLIC BLOOD PRESSURE: 87 MMHG | SYSTOLIC BLOOD PRESSURE: 137 MMHG | RESPIRATION RATE: 18 BRPM

## 2023-11-10 DIAGNOSIS — R20.2 PARESTHESIA OF SKIN: ICD-10-CM

## 2023-11-10 DIAGNOSIS — R20.0 ANESTHESIA OF SKIN: ICD-10-CM

## 2023-11-10 LAB
ALBUMIN SERPL ELPH-MCNC: 4.9 G/DL — SIGNIFICANT CHANGE UP (ref 3.5–5.2)
ALBUMIN SERPL ELPH-MCNC: 4.9 G/DL — SIGNIFICANT CHANGE UP (ref 3.5–5.2)
ALP SERPL-CCNC: 56 U/L — SIGNIFICANT CHANGE UP (ref 30–115)
ALP SERPL-CCNC: 56 U/L — SIGNIFICANT CHANGE UP (ref 30–115)
ALT FLD-CCNC: 42 U/L — HIGH (ref 0–41)
ALT FLD-CCNC: 42 U/L — HIGH (ref 0–41)
ANION GAP SERPL CALC-SCNC: 10 MMOL/L — SIGNIFICANT CHANGE UP (ref 7–14)
ANION GAP SERPL CALC-SCNC: 10 MMOL/L — SIGNIFICANT CHANGE UP (ref 7–14)
APTT BLD: 31.8 SEC — SIGNIFICANT CHANGE UP (ref 27–39.2)
APTT BLD: 31.8 SEC — SIGNIFICANT CHANGE UP (ref 27–39.2)
AST SERPL-CCNC: 37 U/L — SIGNIFICANT CHANGE UP (ref 0–41)
AST SERPL-CCNC: 37 U/L — SIGNIFICANT CHANGE UP (ref 0–41)
BASOPHILS # BLD AUTO: 0.14 K/UL — SIGNIFICANT CHANGE UP (ref 0–0.2)
BASOPHILS # BLD AUTO: 0.14 K/UL — SIGNIFICANT CHANGE UP (ref 0–0.2)
BASOPHILS NFR BLD AUTO: 1.1 % — HIGH (ref 0–1)
BASOPHILS NFR BLD AUTO: 1.1 % — HIGH (ref 0–1)
BILIRUB SERPL-MCNC: 0.4 MG/DL — SIGNIFICANT CHANGE UP (ref 0.2–1.2)
BILIRUB SERPL-MCNC: 0.4 MG/DL — SIGNIFICANT CHANGE UP (ref 0.2–1.2)
BUN SERPL-MCNC: 15 MG/DL — SIGNIFICANT CHANGE UP (ref 10–20)
BUN SERPL-MCNC: 15 MG/DL — SIGNIFICANT CHANGE UP (ref 10–20)
CALCIUM SERPL-MCNC: 10 MG/DL — SIGNIFICANT CHANGE UP (ref 8.4–10.4)
CALCIUM SERPL-MCNC: 10 MG/DL — SIGNIFICANT CHANGE UP (ref 8.4–10.4)
CHLORIDE SERPL-SCNC: 100 MMOL/L — SIGNIFICANT CHANGE UP (ref 98–110)
CHLORIDE SERPL-SCNC: 100 MMOL/L — SIGNIFICANT CHANGE UP (ref 98–110)
CO2 SERPL-SCNC: 28 MMOL/L — SIGNIFICANT CHANGE UP (ref 17–32)
CO2 SERPL-SCNC: 28 MMOL/L — SIGNIFICANT CHANGE UP (ref 17–32)
CREAT SERPL-MCNC: 0.9 MG/DL — SIGNIFICANT CHANGE UP (ref 0.7–1.5)
CREAT SERPL-MCNC: 0.9 MG/DL — SIGNIFICANT CHANGE UP (ref 0.7–1.5)
EGFR: 123 ML/MIN/1.73M2 — SIGNIFICANT CHANGE UP
EGFR: 123 ML/MIN/1.73M2 — SIGNIFICANT CHANGE UP
EOSINOPHIL # BLD AUTO: 0.04 K/UL — SIGNIFICANT CHANGE UP (ref 0–0.7)
EOSINOPHIL # BLD AUTO: 0.04 K/UL — SIGNIFICANT CHANGE UP (ref 0–0.7)
EOSINOPHIL NFR BLD AUTO: 0.3 % — SIGNIFICANT CHANGE UP (ref 0–8)
EOSINOPHIL NFR BLD AUTO: 0.3 % — SIGNIFICANT CHANGE UP (ref 0–8)
GLUCOSE SERPL-MCNC: 134 MG/DL — HIGH (ref 70–99)
GLUCOSE SERPL-MCNC: 134 MG/DL — HIGH (ref 70–99)
HCT VFR BLD CALC: 46.4 % — SIGNIFICANT CHANGE UP (ref 42–52)
HCT VFR BLD CALC: 46.4 % — SIGNIFICANT CHANGE UP (ref 42–52)
HGB BLD-MCNC: 16 G/DL — SIGNIFICANT CHANGE UP (ref 14–18)
HGB BLD-MCNC: 16 G/DL — SIGNIFICANT CHANGE UP (ref 14–18)
IMM GRANULOCYTES NFR BLD AUTO: 0.3 % — SIGNIFICANT CHANGE UP (ref 0.1–0.3)
IMM GRANULOCYTES NFR BLD AUTO: 0.3 % — SIGNIFICANT CHANGE UP (ref 0.1–0.3)
INR BLD: 1.1 RATIO — SIGNIFICANT CHANGE UP (ref 0.65–1.3)
INR BLD: 1.1 RATIO — SIGNIFICANT CHANGE UP (ref 0.65–1.3)
LYMPHOCYTES # BLD AUTO: 18.2 % — LOW (ref 20.5–51.1)
LYMPHOCYTES # BLD AUTO: 18.2 % — LOW (ref 20.5–51.1)
LYMPHOCYTES # BLD AUTO: 2.22 K/UL — SIGNIFICANT CHANGE UP (ref 1.2–3.4)
LYMPHOCYTES # BLD AUTO: 2.22 K/UL — SIGNIFICANT CHANGE UP (ref 1.2–3.4)
MCHC RBC-ENTMCNC: 30.4 PG — SIGNIFICANT CHANGE UP (ref 27–31)
MCHC RBC-ENTMCNC: 30.4 PG — SIGNIFICANT CHANGE UP (ref 27–31)
MCHC RBC-ENTMCNC: 34.5 G/DL — SIGNIFICANT CHANGE UP (ref 32–37)
MCHC RBC-ENTMCNC: 34.5 G/DL — SIGNIFICANT CHANGE UP (ref 32–37)
MCV RBC AUTO: 88.2 FL — SIGNIFICANT CHANGE UP (ref 80–94)
MCV RBC AUTO: 88.2 FL — SIGNIFICANT CHANGE UP (ref 80–94)
MONOCYTES # BLD AUTO: 0.84 K/UL — HIGH (ref 0.1–0.6)
MONOCYTES # BLD AUTO: 0.84 K/UL — HIGH (ref 0.1–0.6)
MONOCYTES NFR BLD AUTO: 6.9 % — SIGNIFICANT CHANGE UP (ref 1.7–9.3)
MONOCYTES NFR BLD AUTO: 6.9 % — SIGNIFICANT CHANGE UP (ref 1.7–9.3)
NEUTROPHILS # BLD AUTO: 8.91 K/UL — HIGH (ref 1.4–6.5)
NEUTROPHILS # BLD AUTO: 8.91 K/UL — HIGH (ref 1.4–6.5)
NEUTROPHILS NFR BLD AUTO: 73.2 % — SIGNIFICANT CHANGE UP (ref 42.2–75.2)
NEUTROPHILS NFR BLD AUTO: 73.2 % — SIGNIFICANT CHANGE UP (ref 42.2–75.2)
NRBC # BLD: 0 /100 WBCS — SIGNIFICANT CHANGE UP (ref 0–0)
NRBC # BLD: 0 /100 WBCS — SIGNIFICANT CHANGE UP (ref 0–0)
PLATELET # BLD AUTO: 344 K/UL — SIGNIFICANT CHANGE UP (ref 130–400)
PLATELET # BLD AUTO: 344 K/UL — SIGNIFICANT CHANGE UP (ref 130–400)
PMV BLD: 8.2 FL — SIGNIFICANT CHANGE UP (ref 7.4–10.4)
PMV BLD: 8.2 FL — SIGNIFICANT CHANGE UP (ref 7.4–10.4)
POTASSIUM SERPL-MCNC: 3.8 MMOL/L — SIGNIFICANT CHANGE UP (ref 3.5–5)
POTASSIUM SERPL-MCNC: 3.8 MMOL/L — SIGNIFICANT CHANGE UP (ref 3.5–5)
POTASSIUM SERPL-SCNC: 3.8 MMOL/L — SIGNIFICANT CHANGE UP (ref 3.5–5)
POTASSIUM SERPL-SCNC: 3.8 MMOL/L — SIGNIFICANT CHANGE UP (ref 3.5–5)
PROT SERPL-MCNC: 7.8 G/DL — SIGNIFICANT CHANGE UP (ref 6–8)
PROT SERPL-MCNC: 7.8 G/DL — SIGNIFICANT CHANGE UP (ref 6–8)
PROTHROM AB SERPL-ACNC: 12.5 SEC — SIGNIFICANT CHANGE UP (ref 9.95–12.87)
PROTHROM AB SERPL-ACNC: 12.5 SEC — SIGNIFICANT CHANGE UP (ref 9.95–12.87)
RBC # BLD: 5.26 M/UL — SIGNIFICANT CHANGE UP (ref 4.7–6.1)
RBC # BLD: 5.26 M/UL — SIGNIFICANT CHANGE UP (ref 4.7–6.1)
RBC # FLD: 12.6 % — SIGNIFICANT CHANGE UP (ref 11.5–14.5)
RBC # FLD: 12.6 % — SIGNIFICANT CHANGE UP (ref 11.5–14.5)
SODIUM SERPL-SCNC: 138 MMOL/L — SIGNIFICANT CHANGE UP (ref 135–146)
SODIUM SERPL-SCNC: 138 MMOL/L — SIGNIFICANT CHANGE UP (ref 135–146)
TROPONIN T SERPL-MCNC: <0.01 NG/ML — SIGNIFICANT CHANGE UP
TROPONIN T SERPL-MCNC: <0.01 NG/ML — SIGNIFICANT CHANGE UP
WBC # BLD: 12.19 K/UL — HIGH (ref 4.8–10.8)
WBC # BLD: 12.19 K/UL — HIGH (ref 4.8–10.8)
WBC # FLD AUTO: 12.19 K/UL — HIGH (ref 4.8–10.8)
WBC # FLD AUTO: 12.19 K/UL — HIGH (ref 4.8–10.8)

## 2023-11-10 PROCEDURE — 70450 CT HEAD/BRAIN W/O DYE: CPT | Mod: MA

## 2023-11-10 PROCEDURE — 93010 ELECTROCARDIOGRAM REPORT: CPT

## 2023-11-10 PROCEDURE — 99285 EMERGENCY DEPT VISIT HI MDM: CPT

## 2023-11-10 PROCEDURE — 0042T: CPT | Mod: MA

## 2023-11-10 PROCEDURE — 70496 CT ANGIOGRAPHY HEAD: CPT | Mod: MA

## 2023-11-10 PROCEDURE — 85610 PROTHROMBIN TIME: CPT

## 2023-11-10 PROCEDURE — 93005 ELECTROCARDIOGRAM TRACING: CPT

## 2023-11-10 PROCEDURE — 70498 CT ANGIOGRAPHY NECK: CPT | Mod: 26,MA

## 2023-11-10 PROCEDURE — 82962 GLUCOSE BLOOD TEST: CPT

## 2023-11-10 PROCEDURE — 85025 COMPLETE CBC W/AUTO DIFF WBC: CPT

## 2023-11-10 PROCEDURE — 70496 CT ANGIOGRAPHY HEAD: CPT | Mod: 26,MA

## 2023-11-10 PROCEDURE — 84484 ASSAY OF TROPONIN QUANT: CPT

## 2023-11-10 PROCEDURE — 80053 COMPREHEN METABOLIC PANEL: CPT

## 2023-11-10 PROCEDURE — 36415 COLL VENOUS BLD VENIPUNCTURE: CPT

## 2023-11-10 PROCEDURE — 70450 CT HEAD/BRAIN W/O DYE: CPT | Mod: 26,MA,59

## 2023-11-10 PROCEDURE — 85730 THROMBOPLASTIN TIME PARTIAL: CPT

## 2023-11-10 PROCEDURE — 70498 CT ANGIOGRAPHY NECK: CPT | Mod: MA

## 2023-11-10 PROCEDURE — 99285 EMERGENCY DEPT VISIT HI MDM: CPT | Mod: 25

## 2023-11-10 NOTE — ED ADULT TRIAGE NOTE - CHIEF COMPLAINT QUOTE
pt c/o left arm numbness, right facial numbness, blurry vision and aphasia starting a half hour ago. Aphasia now resolved.  in triage.

## 2023-11-10 NOTE — CONSULT NOTE ADULT - SUBJECTIVE AND OBJECTIVE BOX
NEUROLOGY CONSULT    HPI:  23M w/ PMH of depression and anxiety on aripripazole, mirtazipine, and gabapentin 600 mg TID, who came in with cc of numbness and tingling in the right side of the face and both hands he is unsure when the symptoms started at times he says 1 hour ago another time he said half an hour ago. He denies fevers, chills, chest pain, n/v, headache ringing in the ear or ear fullness. He denies any hx of seizures. He denies any trauma or falls. Per mom he does not have any hx of seizures. He reports he uses marijuana and last used it today around 12PM. He notes that the tingling in the face and right hand resolved,      MEDICATIONS  Home Medications:    aripripazole  mirtazipine  gabapentin 600 mg TID    MEDICATIONS  (STANDING):    MEDICATIONS  (PRN):      FAMILY HISTORY:    SOCIAL HISTORY: denies tobacco or alcohol use, endorsees marijuana use     Allergies    No Known Allergies    Intolerances        NEURO:   MENTAL STATUS: AAOx3  LANG/SPEECH: Fluent, intact naming, repetition & comprehension  CRANIAL NERVES:  II: Pupils equal and reactive, no RAPD, normal visual field  III, IV, VI: EOM intact, no gaze preference or deviation  V: normal  VII: no facial asymmetry  VIII: normal hearing to speech  MOTOR: 5/5 in both upper and lower extremities  REFLEXES: 2/4 throughout, bilateral flexor plantars  SENSORY: Normal to touch, temperature & pin prick in all extremities  COORD: Normal finger to nose and heel to shin, no tremor, no dysmetria  Gait: narrow based steady    NIHSS: 0    LABS:                        16.0   12.19 )-----------( 344      ( 10 Nov 2023 15:33 )             46.4           Hemoglobin A1C:   Vitamin B12     CAPILLARY BLOOD GLUCOSE      POCT Blood Glucose.: 106 mg/dL (10 Nov 2023 15:07)              Microbiology:      RADIOLOGY, EKG AND ADDITIONAL TESTS: Reviewed.  HCT non-con: negative no acute bleed  CTP: negative  CTA H&N: no stenosis or occlusion, official report pending   NEUROLOGY CONSULT    HPI:  23M w/ PMH of depression and anxiety on aripripazole, mirtazipine, and gabapentin 600 mg TID, who came in with cc of numbness and tingling in the right side of the face and both hands he is unsure when the symptoms started at times he says 1 hour ago another time he said half an hour ago. He denies fevers, chills, chest pain, n/v, headache ringing in the ear or ear fullness. He denies any hx of seizures. He denies any trauma or falls. Per mom he does not have any hx of seizures. He reports he uses marijuana and last used it today around 12PM. He notes that the tingling in the face and right hand resolved. He denies any loss of consciousness or weakness.     MEDICATIONS  Home Medications:    aripripazole  mirtazipine  gabapentin 600 mg TID    MEDICATIONS  (STANDING):    MEDICATIONS  (PRN):      FAMILY HISTORY:    SOCIAL HISTORY: denies tobacco or alcohol use, endorsees marijuana use     Allergies    No Known Allergies    Intolerances        NEURO:   MENTAL STATUS: AAOx3  LANG/SPEECH: Fluent, intact naming, repetition & comprehension  CRANIAL NERVES:  II: Pupils equal and reactive, no RAPD, normal visual field  III, IV, VI: EOM intact, no gaze preference or deviation  V: normal  VII: no facial asymmetry  VIII: normal hearing to speech  MOTOR: 5/5 in both upper and lower extremities  REFLEXES: 2/4 throughout, bilateral flexor plantars  SENSORY: Normal to touch, temperature & pin prick in all extremities  COORD: Normal finger to nose and heel to shin, no tremor, no dysmetria  Gait: narrow based steady    NIHSS: 0    LABS:                        16.0   12.19 )-----------( 344      ( 10 Nov 2023 15:33 )             46.4           Hemoglobin A1C:   Vitamin B12     CAPILLARY BLOOD GLUCOSE      POCT Blood Glucose.: 106 mg/dL (10 Nov 2023 15:07)              Microbiology:      RADIOLOGY, EKG AND ADDITIONAL TESTS: Reviewed.  HCT non-con: negative no acute bleed  CTP: negative  CTA H&N: no stenosis or occlusion

## 2023-11-10 NOTE — ED PROVIDER NOTE - OBJECTIVE STATEMENT
23-year-old male past medical history of depression and anxiety presents to the ED complaining of numbness and tingling in the left side of his face and left hand approximately 1 hour prior to arrival.  Patient endorses marijuana use.  Patient's not had any recent fever, headache, dizziness, chest pain, difficulty breathing, nausea, vomiting, or diarrhea.

## 2023-11-10 NOTE — CONSULT NOTE ADULT - ASSESSMENT
23M w/ PMH of depression and anxiety who presents with cc of tingling in R side of face and hands for about 1 hour. He reports recent marijuana use today around 12PM. low suspicison for stroke NIHSS 0, no Brain MRI needed at this time symptoms could likely be 2/2 drug use.     Recommendations  - low suspicion for stroke NIHSS 0, no Brain MRI needed at this time  - urine drug screen  - metabolic and infectious workup    Case discussed with Dr. Amor   23M w/ PMH of depression and anxiety who presents with cc of tingling in R side of face and hands for about 1 hour. He reports recent marijuana use today around 12PM. low suspicison for stroke NIHSS 0, no Brain MRI needed at this time symptoms could likely be 2/2 drug use.     Recommendations  - low suspicion for stroke NIHSS 0, no Brain MRI needed at this time, no further neurological workup  - urine drug screen  - metabolic and infectious workup    Case discussed with Dr. Amor

## 2023-11-10 NOTE — ED ADULT NURSE NOTE - NSFALLRISKINTERV_ED_ALL_ED

## 2023-11-10 NOTE — ED PROVIDER NOTE - NSFOLLOWUPINSTRUCTIONS_ED_ALL_ED_FT
Please stop using Marijuana and follow with your primary physician and return to ED for any new or worsening of symptoms.

## 2023-11-10 NOTE — ED PROVIDER NOTE - CLINICAL SUMMARY MEDICAL DECISION MAKING FREE TEXT BOX
22-year-old male with no past medical history depression presents with complaint of numbness of the left side of the face and left arm, no pain no motor deficit approximately 1 hours before coming here. imaging no acute pathologies, pt now at baseline no tingling sensation anymore, neuro also cleared pt for outpatient follow up. advised cigarets and marijuana cessation.

## 2023-11-10 NOTE — ED PROVIDER NOTE - ATTENDING CONTRIBUTION TO CARE
I have personally performed a history and physical exam on this patient and personally directed the management of the patient.  22-year-old male with no past medical history depression presents with complaint of numbness of the left side of the face and left arm, no pain no motor deficit approximately 1 hours before coming here.  denies trauma, code stroke activated.  CON: appears stated age, pleasant, no acute distress, HENMT: normocephalic, atraumatic, anicteric, no conjunctival injection,  CV: regular rhythm, distal pulses intact, RESP: no acute respiratory distress, no stridor, breathing comfortably on RA , GI:  soft, nontender, no rebound, no guarding, SKIN: no wounds MSK: no deformities, NEURO: no gross motor deficit Psychiatric: appropriate mood, appropriate affect  will send labs and ct imaging stroke protocol and reevaluate

## 2023-11-10 NOTE — ED PROVIDER NOTE - PATIENT PORTAL LINK FT
You can access the FollowMyHealth Patient Portal offered by [facility] by registering at the following website: [email or web address]. By joining [employer]’s FollowMyHealth portal, you will also be able to view your health information using other applications (apps) compatible with our system.

## 2023-11-14 RX ORDER — ARIPIPRAZOLE 2 MG/1
2 TABLET ORAL
Qty: 30 | Refills: 1 | Status: DISCONTINUED | COMMUNITY
Start: 2023-10-26 | End: 2023-11-14

## 2023-11-21 ENCOUNTER — APPOINTMENT (OUTPATIENT)
Dept: PSYCHIATRY | Facility: CLINIC | Age: 23
End: 2023-11-21
Payer: MEDICAID

## 2023-11-21 ENCOUNTER — OUTPATIENT (OUTPATIENT)
Dept: OUTPATIENT SERVICES | Facility: HOSPITAL | Age: 23
LOS: 1 days | End: 2023-11-21
Payer: MEDICAID

## 2023-11-21 DIAGNOSIS — F34.0 CYCLOTHYMIC DISORDER: ICD-10-CM

## 2023-11-21 DIAGNOSIS — F41.1 GENERALIZED ANXIETY DISORDER: ICD-10-CM

## 2023-11-21 PROCEDURE — 99214 OFFICE O/P EST MOD 30 MIN: CPT

## 2023-11-21 RX ORDER — ARIPIPRAZOLE 15 MG/1
15 TABLET ORAL DAILY
Qty: 30 | Refills: 1 | Status: DISCONTINUED | COMMUNITY
Start: 2023-01-11 | End: 2023-11-21

## 2023-11-22 DIAGNOSIS — F34.0 CYCLOTHYMIC DISORDER: ICD-10-CM

## 2023-11-22 DIAGNOSIS — F41.1 GENERALIZED ANXIETY DISORDER: ICD-10-CM

## 2023-11-27 ENCOUNTER — OUTPATIENT (OUTPATIENT)
Dept: OUTPATIENT SERVICES | Facility: HOSPITAL | Age: 23
LOS: 1 days | End: 2023-11-27
Payer: COMMERCIAL

## 2023-11-27 DIAGNOSIS — K02.9 DENTAL CARIES, UNSPECIFIED: ICD-10-CM

## 2023-11-27 PROCEDURE — D0330: CPT

## 2023-11-27 PROCEDURE — D0140: CPT

## 2023-11-27 PROCEDURE — D0230: CPT

## 2023-12-01 NOTE — ED ADULT NURSE NOTE - CAS DISCH TRANSFER METHOD
Please push fluids and make sure you are staying hydrated. Tylenol or ibuprofen for pain or fever. Please start the antibiotic today and complete entire course of treatment. Use albuterol inhaler every 4 hours as needed for cough. Close follow-up with your primary care provider is recommended.   Any worsening symptoms please go to the ER Private car

## 2023-12-06 ENCOUNTER — OUTPATIENT (OUTPATIENT)
Dept: OUTPATIENT SERVICES | Facility: HOSPITAL | Age: 23
LOS: 1 days | End: 2023-12-06
Payer: COMMERCIAL

## 2023-12-06 DIAGNOSIS — K02.9 DENTAL CARIES, UNSPECIFIED: ICD-10-CM

## 2023-12-06 PROCEDURE — D0140: CPT

## 2023-12-11 ENCOUNTER — APPOINTMENT (OUTPATIENT)
Dept: PSYCHIATRY | Facility: CLINIC | Age: 23
End: 2023-12-11
Payer: MEDICAID

## 2023-12-11 ENCOUNTER — NON-APPOINTMENT (OUTPATIENT)
Age: 23
End: 2023-12-11

## 2023-12-12 ENCOUNTER — OUTPATIENT (OUTPATIENT)
Dept: OUTPATIENT SERVICES | Facility: HOSPITAL | Age: 23
LOS: 1 days | End: 2023-12-12
Payer: MEDICAID

## 2023-12-12 ENCOUNTER — APPOINTMENT (OUTPATIENT)
Dept: PSYCHIATRY | Facility: CLINIC | Age: 23
End: 2023-12-12
Payer: MEDICAID

## 2023-12-12 DIAGNOSIS — K02.9 DENTAL CARIES, UNSPECIFIED: ICD-10-CM

## 2023-12-12 DIAGNOSIS — F34.0 CYCLOTHYMIC DISORDER: ICD-10-CM

## 2023-12-12 DIAGNOSIS — G47.00 INSOMNIA, UNSPECIFIED: ICD-10-CM

## 2023-12-12 DIAGNOSIS — F41.1 GENERALIZED ANXIETY DISORDER: ICD-10-CM

## 2023-12-12 PROCEDURE — 99214 OFFICE O/P EST MOD 30 MIN: CPT | Mod: 95

## 2023-12-13 DIAGNOSIS — F34.0 CYCLOTHYMIC DISORDER: ICD-10-CM

## 2023-12-13 DIAGNOSIS — G47.00 INSOMNIA, UNSPECIFIED: ICD-10-CM

## 2023-12-13 DIAGNOSIS — F41.1 GENERALIZED ANXIETY DISORDER: ICD-10-CM

## 2024-01-02 NOTE — ED ADULT NURSE NOTE - NS ED BHA CANNABIS
Please call patient to schedule an appointment.  
Requested Prescriptions     Pending Prescriptions Disp Refills    NURTEC 75 MG TBDP [Pharmacy Med Name: Nurtec 75 MG Oral Tablet Disintegrating] 30 tablet 0     Sig: DISSOLVE 1 TABLET BY MOUTH AS NEEDED (MAX  ONE  TABLET  IN  24  HOURS)    QULIPTA 60 MG TABS [Pharmacy Med Name: Qulipta 60 MG Oral Tablet] 30 tablet 0     Sig: Take 1 tablet by mouth once daily       Last Office Visit: 10/6/2022  Next Office Visit: none  Last Medication Refill:  BOTH 10/6/22    10/6/22 was a new pt visit. Pt canceled last two scheduled appts. Needs appt.   
Yes

## 2024-01-09 ENCOUNTER — OUTPATIENT (OUTPATIENT)
Dept: OUTPATIENT SERVICES | Facility: HOSPITAL | Age: 24
LOS: 1 days | End: 2024-01-09
Payer: MEDICAID

## 2024-01-09 ENCOUNTER — APPOINTMENT (OUTPATIENT)
Dept: PSYCHIATRY | Facility: CLINIC | Age: 24
End: 2024-01-09
Payer: MEDICAID

## 2024-01-09 DIAGNOSIS — G47.00 INSOMNIA, UNSPECIFIED: ICD-10-CM

## 2024-01-09 DIAGNOSIS — F34.0 CYCLOTHYMIC DISORDER: ICD-10-CM

## 2024-01-09 DIAGNOSIS — F41.1 GENERALIZED ANXIETY DISORDER: ICD-10-CM

## 2024-01-09 PROCEDURE — 99214 OFFICE O/P EST MOD 30 MIN: CPT | Mod: 95

## 2024-01-10 DIAGNOSIS — F34.0 CYCLOTHYMIC DISORDER: ICD-10-CM

## 2024-01-10 DIAGNOSIS — G47.00 INSOMNIA, UNSPECIFIED: ICD-10-CM

## 2024-01-10 DIAGNOSIS — F41.1 GENERALIZED ANXIETY DISORDER: ICD-10-CM

## 2024-02-06 ENCOUNTER — APPOINTMENT (OUTPATIENT)
Dept: PSYCHIATRY | Facility: CLINIC | Age: 24
End: 2024-02-06
Payer: MEDICAID

## 2024-02-06 ENCOUNTER — OUTPATIENT (OUTPATIENT)
Dept: OUTPATIENT SERVICES | Facility: HOSPITAL | Age: 24
LOS: 1 days | End: 2024-02-06
Payer: MEDICAID

## 2024-02-06 DIAGNOSIS — F34.0 CYCLOTHYMIC DISORDER: ICD-10-CM

## 2024-02-06 DIAGNOSIS — F41.9 ANXIETY DISORDER, UNSPECIFIED: ICD-10-CM

## 2024-02-06 DIAGNOSIS — G47.00 INSOMNIA, UNSPECIFIED: ICD-10-CM

## 2024-02-06 DIAGNOSIS — F41.1 GENERALIZED ANXIETY DISORDER: ICD-10-CM

## 2024-02-06 PROCEDURE — 99214 OFFICE O/P EST MOD 30 MIN: CPT | Mod: 95

## 2024-02-07 DIAGNOSIS — F34.0 CYCLOTHYMIC DISORDER: ICD-10-CM

## 2024-02-07 DIAGNOSIS — F41.1 GENERALIZED ANXIETY DISORDER: ICD-10-CM

## 2024-02-07 DIAGNOSIS — G47.00 INSOMNIA, UNSPECIFIED: ICD-10-CM

## 2024-03-04 ENCOUNTER — APPOINTMENT (OUTPATIENT)
Dept: PSYCHIATRY | Facility: CLINIC | Age: 24
End: 2024-03-04
Payer: COMMERCIAL

## 2024-03-04 ENCOUNTER — OUTPATIENT (OUTPATIENT)
Dept: OUTPATIENT SERVICES | Facility: HOSPITAL | Age: 24
LOS: 1 days | End: 2024-03-04
Payer: COMMERCIAL

## 2024-03-04 DIAGNOSIS — F34.0 CYCLOTHYMIC DISORDER: ICD-10-CM

## 2024-03-04 DIAGNOSIS — F41.1 GENERALIZED ANXIETY DISORDER: ICD-10-CM

## 2024-03-04 DIAGNOSIS — G47.00 INSOMNIA, UNSPECIFIED: ICD-10-CM

## 2024-03-04 PROCEDURE — 99214 OFFICE O/P EST MOD 30 MIN: CPT | Mod: 95

## 2024-03-04 NOTE — CURRENT PSYCHIATRIC SYMPTOMS
[Excessive Worry] : excessive worry [Panic] : panic  [Depressed Mood] : no depressed mood [Anhedonia] : no anhedonia [Guilt] : not feeling guilty [Decreased Concentration] : no decrease in concentrating ability [Hyperphagia] : no hyperphagia [Insomnia] : no insomnia disorder [Hypersomnia] : no ~T hypersomnia [Psychomotor Retardation] : no psychomotor retardation [Anorexia] : no anorexia [Highly Irritable] : no high irritability [Euphoria] : no euphoria [Increased Activity] : no increased in activity [Distractibility] : not distracted [Grandeur] : no feelings of grandeur [Talkativeness] : no talkativeness [Hypersexuality] : denied hypersexuality [Buying Sprees] : no buying sprees [Dec Need For Sleep] : no decreased need for sleep [Delusions] : no ~T delusions [Hallucination Visual] : no visual hallucinations [Hallucination Tactile] : no tactile hallucinations [Hallucination Auditory] : no auditory hallucinations [Ruminations] : no rumination disorder [Thought Disorder] : ~T a thought disorder was not noted [Obsessions] : no obsessions [Re-experiencing] : no re-experiencing [Restlessness] : no restlessness [de-identified] : anxious [Hypochondriasis] : no hypochondriasis

## 2024-03-04 NOTE — PAST MEDICAL HISTORY
[FreeTextEntry1] : 22 y/o single M referred by ED following ED visits 6/2/22 and 6/13/22 for worsening anxiety and prescribed hydroxyzine with improvement ("more calm with it"), regular marijuana user, PMH hearing impaired R ear, no reported PPH, saw therapist "for a few sessions" at unknown organization at 17 y/o but discontinued treatment due to "I didn't feel comfortable there," has never before seen psych, currently employed full time at [employer] 1 year, currently domiciled with mother, stepfather (who raised him from birth), and 3   sisters (15 y/o F, 10 y/o F, 7 y/o F) in private apartment (limited relationship with biological father). As per [employer], Mickey originally contacted [facility] OPD 12/2021 and endorsed substance use at that time and was referred to [facility] but Mickey did not follow-up. When asked, Mickey states he last used unprescribed Xanax, Percocet, and acid recreationally 2 years ago, denies any current use (notes while on acid saw colors/lights that has continued and worried about permanency, encouraged to follow up with eye doctor?). Mickey reports "extreme anxiety and anger issues," describing anxiety as excessive worry, worsening when in social situations, "I overthink a lot and the more I overthink I end up in a bad place in my mind and it's difficult to be around people," resulting in self-isolation. Panic episodes once per week described as difficulty breathing, racing thoughts, shakiness, and "felt like I couldn't walk," most recent episode 6/28/22. Mickey reports current mild depressed mood that has improved over the last 2 weeks, decreased appetite, and difficulty sleeping (works night shifts). Mickey notes he broke up in his girlfriend 5/2022 and at that time experienced worsening decreased appetite, 20 lbs unintentional weight loss, excessive crying, loss of motivation "difficulty going to work for 2 weeks," and hopelessness that has since improved due to getting back together recently. (+) recent passive SI "I had thoughts of wanting to hurt myself, I wish I didn't wake up," "it's when I feel like my life isn't going anywhere," none currently, no intent or plan, no hx of SA. Mickey reports episodes of difficulty with emotional regulation "when I don't know how to handle a situation," specifically high stress situations, described as "irritability, explosiveness, and frustration" resulting in physical aggression towards property, no HI. In 2021 he injured his hand by punching a piece of metal. No IPP admissions.

## 2024-03-04 NOTE — FAMILY HISTORY
[FreeTextEntry1] : Family composition: never , no children\par  Family history and background: raised by mother and stepfather (stepdad since birth), mother and biological father  before his birth, limited relationship with biological father "we talk rarely," 3   sisters (15 y/o F, 10 y/o F, 9 y/o F) \par  Family relationship: supportive/stressful with mother and stepfather, supportive relationships with 1/s sisters "I love them, they're just sister, we're good" \par  Pertinent Family Medical, MH and Substance Use History including Adult Child of Alcoholic and child of substance abuse status; history of cancer and heart disease\par  *feels like mental health hx in family but undiagnosed\par  Step-father - alcohol abuse, not completely sober but has decreased alcohol use recently\par

## 2024-03-04 NOTE — CURRENT PSYCHIATRIC SYMPTOMS
[Excessive Worry] : excessive worry [Panic] : panic  [Depressed Mood] : no depressed mood [Anhedonia] : no anhedonia [Guilt] : not feeling guilty [Decreased Concentration] : no decrease in concentrating ability [Insomnia] : no insomnia disorder [Hyperphagia] : no hyperphagia [Hypersomnia] : no ~T hypersomnia [Psychomotor Retardation] : no psychomotor retardation [Anorexia] : no anorexia [Euphoria] : no euphoria [Highly Irritable] : no high irritability [Increased Activity] : no increased in activity [Distractibility] : not distracted [Talkativeness] : no talkativeness [Grandeur] : no feelings of grandeur [Buying Sprees] : no buying sprees [Hypersexuality] : denied hypersexuality [Dec Need For Sleep] : no decreased need for sleep [Delusions] : no ~T delusions [Hallucination Visual] : no visual hallucinations [Hallucination Auditory] : no auditory hallucinations [Hallucination Tactile] : no tactile hallucinations [Thought Disorder] : ~T a thought disorder was not noted [Ruminations] : no rumination disorder [Obsessions] : no obsessions [Re-experiencing] : no re-experiencing [Hypochondriasis] : no hypochondriasis [Restlessness] : no restlessness [de-identified] : anxious

## 2024-03-04 NOTE — PAST MEDICAL HISTORY
[FreeTextEntry1] : 22 y/o single M referred by ED following ED visits 6/2/22 and 6/13/22 for worsening anxiety and prescribed hydroxyzine with improvement ("more calm with it"), regular marijuana user, PMH hearing impaired R ear, no reported PPH, saw therapist "for a few sessions" at unknown organization at 17 y/o but discontinued treatment due to "I didn't feel comfortable there," has never before seen psych, currently employed full time at [employer] 1 year, currently domiciled with mother, stepfather (who raised him from birth), and 3   sisters (17 y/o F, 10 y/o F, 7 y/o F) in private apartment (limited relationship with biological father). As per [employer], Mickey originally contacted [facility] OPD 12/2021 and endorsed substance use at that time and was referred to [facility] but Mickey did not follow-up. When asked, Mickey states he last used unprescribed Xanax, Percocet, and acid recreationally 2 years ago, denies any current use (notes while on acid saw colors/lights that has continued and worried about permanency, encouraged to follow up with eye doctor?). Mickey reports "extreme anxiety and anger issues," describing anxiety as excessive worry, worsening when in social situations, "I overthink a lot and the more I overthink I end up in a bad place in my mind and it's difficult to be around people," resulting in self-isolation. Panic episodes once per week described as difficulty breathing, racing thoughts, shakiness, and "felt like I couldn't walk," most recent episode 6/28/22. Mickey reports current mild depressed mood that has improved over the last 2 weeks, decreased appetite, and difficulty sleeping (works night shifts). Mickey notes he broke up in his girlfriend 5/2022 and at that time experienced worsening decreased appetite, 20 lbs unintentional weight loss, excessive crying, loss of motivation "difficulty going to work for 2 weeks," and hopelessness that has since improved due to getting back together recently. (+) recent passive SI "I had thoughts of wanting to hurt myself, I wish I didn't wake up," "it's when I feel like my life isn't going anywhere," none currently, no intent or plan, no hx of SA. Mickey reports episodes of difficulty with emotional regulation "when I don't know how to handle a situation," specifically high stress situations, described as "irritability, explosiveness, and frustration" resulting in physical aggression towards property, no HI. In 2021 he injured his hand by punching a piece of metal. No IPP admissions.

## 2024-03-04 NOTE — REASON FOR VISIT
[Telehealth (audio & video) - Individual/Group] : This visit was provided via telehealth using real-time 2-way audio visual technology. [Medical Office: ([facility])___] : The provider was located at the medical office in [unfilled]. [Home] : The patient, [unfilled], was located at home, [unfilled], at the time of the visit. [Verbal consent obtained from patient/other participant(s)] : Verbal consent for telehealth/telephonic services obtained from patient/other participant(s) [Mother] : mother [FreeTextEntry4] : 1218pm [FreeTextEntry5] : 9447tn [FreeTextEntry2] : 11/23 [TextBox_17] : Patient seen with Mother present who will help with medications going forward [FreeTextEntry1] : "I am good."

## 2024-03-04 NOTE — PLAN
[FreeTextEntry4] : mood is improved  anxiety is low  health is good, will update physical and labs  working and going to school  elected to d/c therapy

## 2024-03-04 NOTE — DISCUSSION/SUMMARY
[Date of Last Physical Exam: _____] : Date of Last Physical Exam: [unfilled] [Date of Last Annual Labs: _____] : Date of Last Annual Labs: [unfilled] [Tobacco Screening Completed?] : Tobacco Screening Completed: Yes [Annual Review of Systems Completed?] : Annual Review of Systems Completed: Yes [Date of Last AIMS: _____] : Date of Last AIMS: [unfilled] [Date of Last HbgA1c: _____] : Date of Last HbgA1c: [unfilled] [Date of Last Lipid Profile: _____] : Date of Last Lipid Profile: [unfilled] [Potential impact of patientâ??s physical health conditions on psychiatric care?] : Potential impact of patient's physical health conditions on psychiatric care: No [Does patient require any additional health services or referrals?] : Does patient require any additional health services or referrals: No [Initial Plan] : Initial Plan [Adherent to treatment recommendations] : adherent to treatment recommendations [Articulate] : articulate [Cognitively intact] : cognitively intact [Motivated to participate in treatment] : motivated to participate in treatment [Motivated to maintain or improve physical health] : motivated to maintain or improve physical health [Part of a supportive family] : part of a supportive family [Housing stability] : housing stability [English fluency] : English fluency [Connected to healthcare] : connected to healthcare [Social supports] : social supports [FreeTextEntry2] : 2/15/25 [FreeTextEntry3] : 7/20/22 [FreeTextEntry8] : None  [FreeTextEntry9] : None  [de-identified] : None at this time  [Interpersonal Relationships] : Interpersonal Relationships [every ___ months] : every [unfilled] months [Mental Health] : Mental Health [Substance Abuse] : Substance Abuse [Initial] : Initial [Treatment is no longer medically necessary as evidenced by:] : Treatment is no longer medically necessary as evidenced by: [None - Reason others did not participate:] : None - Reason others did not participate:  [Yes] : Yes [Psychiatric Provider/Prescriber] : Psychiatric Provider/Prescriber [Therapist] : Therapist [FreeTextEntry1] : Anxiety including racing thoughts. Patient uses Marijuana to calm himself. He used to use acid.  [FreeTextEntry4] : " I don't want to over think"  [de-identified] : Patient describes  anxiety symptoms which include over thinking, racing thoughts, and excessive worry.  [de-identified] : 11/15/22 [de-identified] : Patient will identity at least 2 triggers of anxiety including cognitive ones.  [de-identified] : Patient wants help in reducing anxiety anxiety.  [de-identified] : Patient will learn at least 2 relaxation methods.  [de-identified] : 11/15/22 [FreeTextEntry5] : Motivational interviewing, Psychoeducation, CBT, and Supportive Individual Therapy [de-identified] : Patient no longer needs therapy and/or medication to function at baseline.  [de-identified] : Not clinically indicated

## 2024-03-04 NOTE — HISTORY OF PRESENT ILLNESS
[No] : no [Responsibility to family or others] : responsibility to family or others [None] : none [Future oriented] : future oriented [Identifies reasons for living] : identifies reasons for living [Engaged in work or school] : engaged in work or school [None Known] : none known [Yes] : yes [Irritability] : irritability [Relationship stability] : relationship stability [Residential stability] : residential stability [Employment stability] : employment stability [Affective stability] : affective stability [FreeTextEntry1] : This is a 23 year old patient who presents for medication management.  The patient reports stable mood, sleep and appetite.  The patient denies depression, horace, denies psychosis at this time.  The patient has less anxiety that impairs their daily functioning. The patient denies any suicidal ideation, homicidal ideation, no auditory or visual hallucinations, or paranoid ideation.  The patient has no medical complaints. The patient denies any alcohol use, and is now reporting smoking cannabis and cigarettes at this time.  He will try to reduce and stop cannabis use. I received the patient's updated physical and labs from their PCP.  Coping skills were discussed and a safety plan was provided.  The patient was educated on the risks, benefits, and alternatives of their psychiatric medications.  The patient d/c psychotherapy at this time.  The patient consents to ongoing medication management.   2/28:patient called and stated he had a bad panic attack and was crying, risks/benefits discussed, will raise remeron to 15mg at bedtime, patient will call in a few days as needed, and will be seen in 2 weeks, earlier if needed 3/13: raise remeron for ongoing depression, anxiety 3/29:raise gabapentin for anxiety 4/24:raise mirtazapine 5/22:raise abilify 6/26:improved, continue current medication 8/2:raise abilify and add prazosin.  8/29:back to baseline 9/36:raise abilify for mood swings 10/27: raise abilify to 17mg daily for mood lability 11/14:patient had a panic attack and was told by ER doctor "on too much medication."  He requested to lower medication.  After further discussion, it was likely induced by nicotine and missing his gabapentin.  Patient educated again to take all medication as prescribed, to dd/c nicotine/caffeine, and agree to lower abilify back to 15mg daily 11/21:will reduce gabapentin to BID as he has not taking it consistently, will d/c abilify and start olanzapine 2.5mg at bedtime, continue remeron 45mg at bedtime, educated Mother and patient on strict adherence and to abstain from cannabis/nicotine use, and reduce caffeine use 12/12:stable, at baseline 1/9:no changes, at baseline 2/6:remains at baseline [TextBox_32] : +) recent passive SI "I had thoughts of wanting to hurt myself, I wish I didn't wake up," "it's when I feel like my life isn't going anywhere," none current, no intent or plan, no hx of SA. [de-identified] : \par   [TextBox_52] : +) recent passive SI "I had thoughts of wanting to hurt myself, I wish I didn't wake up," "it's when I feel like my life isn't going anywhere," none current, no intent or plan, no hx of SA.

## 2024-03-04 NOTE — CURRENT PSYCHIATRIC SYMPTOMS
[Excessive Worry] : excessive worry [Panic] : panic  [Depressed Mood] : no depressed mood [Guilt] : not feeling guilty [Anhedonia] : no anhedonia [Decreased Concentration] : no decrease in concentrating ability [Insomnia] : no insomnia disorder [Hyperphagia] : no hyperphagia [Hypersomnia] : no ~T hypersomnia [Psychomotor Retardation] : no psychomotor retardation [Anorexia] : no anorexia [Euphoria] : no euphoria [Highly Irritable] : no high irritability [Increased Activity] : no increased in activity [Distractibility] : not distracted [Talkativeness] : no talkativeness [Grandeur] : no feelings of grandeur [Buying Sprees] : no buying sprees [Hypersexuality] : denied hypersexuality [Dec Need For Sleep] : no decreased need for sleep [Hallucination Visual] : no visual hallucinations [Delusions] : no ~T delusions [Hallucination Auditory] : no auditory hallucinations [Hallucination Tactile] : no tactile hallucinations [Ruminations] : no rumination disorder [Thought Disorder] : ~T a thought disorder was not noted [Obsessions] : no obsessions [Re-experiencing] : no re-experiencing [Hypochondriasis] : no hypochondriasis [Restlessness] : no restlessness [de-identified] : anxious

## 2024-03-04 NOTE — SOCIAL HISTORY
[With Family] : lives with family [Employed] : employed [Never ] : never  [High School] : high school [None] : none [Yes] : yes [FreeTextEntry2] : full time at [employer] 1 year [FreeTextEntry1] : regular marijuana use [TextBox_7] : social drinker, on special occasions [TextBox_52] : unprescribed Xanax recreationally, last use 2020 [FreeTextEntry9] : unprescribed Percocet recreationally, last use 2020 [TextBox_62] : acid, last use 2020

## 2024-03-04 NOTE — HISTORY OF PRESENT ILLNESS
[No] : no [None] : none [Responsibility to family or others] : responsibility to family or others [Identifies reasons for living] : identifies reasons for living [Future oriented] : future oriented [Engaged in work or school] : engaged in work or school [None Known] : none known [Yes] : yes [Irritability] : irritability [Residential stability] : residential stability [Relationship stability] : relationship stability [Employment stability] : employment stability [Affective stability] : affective stability [TextBox_32] : +) recent passive SI "I had thoughts of wanting to hurt myself, I wish I didn't wake up," "it's when I feel like my life isn't going anywhere," none current, no intent or plan, no hx of SA. [FreeTextEntry1] : This is a 23 year old patient who presents for medication management.  The patient reports stable mood, sleep and appetite.  The patient denies depression, horace, denies psychosis at this time.  The patient has less anxiety that impairs their daily functioning. The patient denies any suicidal ideation, homicidal ideation, no auditory or visual hallucinations, or paranoid ideation.  The patient has no medical complaints. The patient denies any alcohol use, and is now reporting smoking cannabis and cigarettes at this time.  He will try to reduce and stop cannabis use. I received the patient's updated physical and labs from their PCP.  Coping skills were discussed and a safety plan was provided.  The patient was educated on the risks, benefits, and alternatives of their psychiatric medications.  The patient d/c psychotherapy at this time.  The patient consents to ongoing medication management.   2/28:patient called and stated he had a bad panic attack and was crying, risks/benefits discussed, will raise remeron to 15mg at bedtime, patient will call in a few days as needed, and will be seen in 2 weeks, earlier if needed 3/13: raise remeron for ongoing depression, anxiety 3/29:raise gabapentin for anxiety 4/24:raise mirtazapine 5/22:raise abilify 6/26:improved, continue current medication 8/2:raise abilify and add prazosin.  8/29:back to baseline 9/36:raise abilify for mood swings 10/27: raise abilify to 17mg daily for mood lability 11/14:patient had a panic attack and was told by ER doctor "on too much medication."  He requested to lower medication.  After further discussion, it was likely induced by nicotine and missing his gabapentin.  Patient educated again to take all medication as prescribed, to dd/c nicotine/caffeine, and agree to lower abilify back to 15mg daily 11/21:will reduce gabapentin to BID as he has not taking it consistently, will d/c abilify and start olanzapine 2.5mg at bedtime, continue remeron 45mg at bedtime, educated Mother and patient on strict adherence and to abstain from cannabis/nicotine use, and reduce caffeine use 12/12:stable, at baseline 1/9:no changes, at baseline 2/6:remains at baseline [TextBox_52] : +) recent passive SI "I had thoughts of wanting to hurt myself, I wish I didn't wake up," "it's when I feel like my life isn't going anywhere," none current, no intent or plan, no hx of SA. [de-identified] : \par

## 2024-03-04 NOTE — SOCIAL HISTORY
[With Family] : lives with family [Employed] : employed [Never ] : never  [High School] : high school [None] : none [Yes] : yes [FreeTextEntry2] : full time at [employer] 1 year [TextBox_7] : social drinker, on special occasions [FreeTextEntry1] : regular marijuana use [FreeTextEntry9] : unprescribed Percocet recreationally, last use 2020 [TextBox_52] : unprescribed Xanax recreationally, last use 2020 [TextBox_62] : acid, last use 2020

## 2024-03-04 NOTE — HISTORY OF PRESENT ILLNESS
[No] : no [Responsibility to family or others] : responsibility to family or others [None] : none [Identifies reasons for living] : identifies reasons for living [Future oriented] : future oriented [Engaged in work or school] : engaged in work or school [None Known] : none known [Yes] : yes [Irritability] : irritability [Residential stability] : residential stability [Relationship stability] : relationship stability [Employment stability] : employment stability [Affective stability] : affective stability [FreeTextEntry1] : This is a 23 year old patient who presents for medication management.  The patient reports stable mood, sleep and appetite.  The patient denies depression, horace, denies psychosis at this time.  The patient has less anxiety that impairs their daily functioning. The patient denies any suicidal ideation, homicidal ideation, no auditory or visual hallucinations, or paranoid ideation.  The patient has no medical complaints. The patient denies any alcohol use, and is now reporting smoking cannabis and cigarettes at this time.  He will try to reduce and stop cannabis use. I received the patient's updated physical and labs from their PCP.  Coping skills were discussed and a safety plan was provided.  The patient was educated on the risks, benefits, and alternatives of their psychiatric medications.  The patient d/c psychotherapy at this time.  The patient consents to ongoing medication management.   2/28:patient called and stated he had a bad panic attack and was crying, risks/benefits discussed, will raise remeron to 15mg at bedtime, patient will call in a few days as needed, and will be seen in 2 weeks, earlier if needed 3/13: raise remeron for ongoing depression, anxiety 3/29:raise gabapentin for anxiety 4/24:raise mirtazapine 5/22:raise abilify 6/26:improved, continue current medication 8/2:raise abilify and add prazosin.  8/29:back to baseline 9/36:raise abilify for mood swings 10/27: raise abilify to 17mg daily for mood lability 11/14:patient had a panic attack and was told by ER doctor "on too much medication."  He requested to lower medication.  After further discussion, it was likely induced by nicotine and missing his gabapentin.  Patient educated again to take all medication as prescribed, to dd/c nicotine/caffeine, and agree to lower abilify back to 15mg daily 11/21:will reduce gabapentin to BID as he has not taking it consistently, will d/c abilify and start olanzapine 2.5mg at bedtime, continue remeron 45mg at bedtime, educated Mother and patient on strict adherence and to abstain from cannabis/nicotine use, and reduce caffeine use 12/12:stable, at baseline 1/9:no changes, at baseline 2/6:remains at baseline [TextBox_32] : +) recent passive SI "I had thoughts of wanting to hurt myself, I wish I didn't wake up," "it's when I feel like my life isn't going anywhere," none current, no intent or plan, no hx of SA. [de-identified] : \par   [TextBox_52] : +) recent passive SI "I had thoughts of wanting to hurt myself, I wish I didn't wake up," "it's when I feel like my life isn't going anywhere," none current, no intent or plan, no hx of SA.

## 2024-03-04 NOTE — DISCUSSION/SUMMARY
[Date of Last Physical Exam: _____] : Date of Last Physical Exam: [unfilled] [Date of Last Annual Labs: _____] : Date of Last Annual Labs: [unfilled] [Tobacco Screening Completed?] : Tobacco Screening Completed: Yes [Annual Review of Systems Completed?] : Annual Review of Systems Completed: Yes [Date of Last AIMS: _____] : Date of Last AIMS: [unfilled] [Date of Last HbgA1c: _____] : Date of Last HbgA1c: [unfilled] [Date of Last Lipid Profile: _____] : Date of Last Lipid Profile: [unfilled] [Potential impact of patientâ??s physical health conditions on psychiatric care?] : Potential impact of patient's physical health conditions on psychiatric care: No [Does patient require any additional health services or referrals?] : Does patient require any additional health services or referrals: No [Initial Plan] : Initial Plan [Adherent to treatment recommendations] : adherent to treatment recommendations [Articulate] : articulate [Cognitively intact] : cognitively intact [Motivated to participate in treatment] : motivated to participate in treatment [Motivated to maintain or improve physical health] : motivated to maintain or improve physical health [Part of a supportive family] : part of a supportive family [Housing stability] : housing stability [English fluency] : English fluency [Connected to healthcare] : connected to healthcare [Social supports] : social supports [FreeTextEntry2] : 2/15/25 [FreeTextEntry3] : 7/20/22 [FreeTextEntry8] : None  [FreeTextEntry9] : None  [de-identified] : None at this time  [Interpersonal Relationships] : Interpersonal Relationships [every ___ months] : every [unfilled] months [Mental Health] : Mental Health [Substance Abuse] : Substance Abuse [Initial] : Initial [Treatment is no longer medically necessary as evidenced by:] : Treatment is no longer medically necessary as evidenced by: [None - Reason others did not participate:] : None - Reason others did not participate:  [Yes] : Yes [Psychiatric Provider/Prescriber] : Psychiatric Provider/Prescriber [Therapist] : Therapist [FreeTextEntry1] : Anxiety including racing thoughts. Patient uses Marijuana to calm himself. He used to use acid.  [FreeTextEntry4] : " I don't want to over think"  [de-identified] : Patient describes  anxiety symptoms which include over thinking, racing thoughts, and excessive worry.  [de-identified] : Patient will identity at least 2 triggers of anxiety including cognitive ones.  [de-identified] : 11/15/22 [de-identified] : Patient will learn at least 2 relaxation methods.  [de-identified] : Patient wants help in reducing anxiety anxiety.  [de-identified] : 11/15/22 [FreeTextEntry5] : Motivational interviewing, Psychoeducation, CBT, and Supportive Individual Therapy [de-identified] : Patient no longer needs therapy and/or medication to function at baseline.  [de-identified] : Not clinically indicated

## 2024-03-04 NOTE — PAST MEDICAL HISTORY
[FreeTextEntry1] : 22 y/o single M referred by ED following ED visits 6/2/22 and 6/13/22 for worsening anxiety and prescribed hydroxyzine with improvement ("more calm with it"), regular marijuana user, PMH hearing impaired R ear, no reported PPH, saw therapist "for a few sessions" at unknown organization at 19 y/o but discontinued treatment due to "I didn't feel comfortable there," has never before seen psych, currently employed full time at [employer] 1 year, currently domiciled with mother, stepfather (who raised him from birth), and 3   sisters (17 y/o F, 10 y/o F, 7 y/o F) in private apartment (limited relationship with biological father). As per [employer], Mickey originally contacted [facility] OPD 12/2021 and endorsed substance use at that time and was referred to [facility] but Mickey did not follow-up. When asked, Mickey states he last used unprescribed Xanax, Percocet, and acid recreationally 2 years ago, denies any current use (notes while on acid saw colors/lights that has continued and worried about permanency, encouraged to follow up with eye doctor?). Mickey reports "extreme anxiety and anger issues," describing anxiety as excessive worry, worsening when in social situations, "I overthink a lot and the more I overthink I end up in a bad place in my mind and it's difficult to be around people," resulting in self-isolation. Panic episodes once per week described as difficulty breathing, racing thoughts, shakiness, and "felt like I couldn't walk," most recent episode 6/28/22. Mickey reports current mild depressed mood that has improved over the last 2 weeks, decreased appetite, and difficulty sleeping (works night shifts). Mickey notes he broke up in his girlfriend 5/2022 and at that time experienced worsening decreased appetite, 20 lbs unintentional weight loss, excessive crying, loss of motivation "difficulty going to work for 2 weeks," and hopelessness that has since improved due to getting back together recently. (+) recent passive SI "I had thoughts of wanting to hurt myself, I wish I didn't wake up," "it's when I feel like my life isn't going anywhere," none currently, no intent or plan, no hx of SA. Mickey reports episodes of difficulty with emotional regulation "when I don't know how to handle a situation," specifically high stress situations, described as "irritability, explosiveness, and frustration" resulting in physical aggression towards property, no HI. In 2021 he injured his hand by punching a piece of metal. No IPP admissions.

## 2024-03-04 NOTE — DISCUSSION/SUMMARY
[Date of Last Physical Exam: _____] : Date of Last Physical Exam: [unfilled] [Date of Last Annual Labs: _____] : Date of Last Annual Labs: [unfilled] [Annual Review of Systems Completed?] : Annual Review of Systems Completed: Yes [Tobacco Screening Completed?] : Tobacco Screening Completed: Yes [Date of Last AIMS: _____] : Date of Last AIMS: [unfilled] [Date of Last HbgA1c: _____] : Date of Last HbgA1c: [unfilled] [Date of Last Lipid Profile: _____] : Date of Last Lipid Profile: [unfilled] [Potential impact of patientâ??s physical health conditions on psychiatric care?] : Potential impact of patient's physical health conditions on psychiatric care: No [FreeTextEntry1] : Patient has been stable, no changes Continue medication: 1. gabapentin 600mg po BID 2. mirtazapine 45mg at bedtime 3. olanzapine 2.5mg at bedtime  Follow up in 4 weeks, earlier as needed [Does patient require any additional health services or referrals?] : Does patient require any additional health services or referrals: No

## 2024-03-04 NOTE — REASON FOR VISIT
[Telehealth (audio & video) - Individual/Group] : This visit was provided via telehealth using real-time 2-way audio visual technology. [Medical Office: ([facility] [place]___] : The provider was located at the medical office in [unfilled]. [Home] : The patient, [unfilled], was located at home, [unfilled], at the time of the visit. [Verbal consent obtained from patient/other participant(s)] : Verbal consent for telehealth/telephonic services obtained from patient/other participant(s) [Mother] : mother [FreeTextEntry4] : 1218pm [FreeTextEntry5] : [id number] [FreeTextEntry2] : 11/23 [TextBox_17] : Patient seen with Mother present who will help with medications going forward [FreeTextEntry1] : "I am good."

## 2024-03-04 NOTE — SOCIAL HISTORY
[With Family] : lives with family [Employed] : employed [Never ] : never  [High School] : high school [None] : none [Yes] : yes [FreeTextEntry2] : full time at [employer] 1 year [TextBox_7] : social drinker, on special occasions [FreeTextEntry1] : regular marijuana use [TextBox_52] : unprescribed Xanax recreationally, last use 2020 [FreeTextEntry9] : unprescribed Percocet recreationally, last use 2020 [TextBox_62] : acid, last use 2020

## 2024-03-04 NOTE — REASON FOR VISIT
[Telehealth (audio & video) - Individual/Group] : This visit was provided via telehealth using real-time 2-way audio visual technology. [Medical Office: ([facility])___] : The provider was located at the medical office in [unfilled]. [Home] : The patient, [unfilled], was located at home, [unfilled], at the time of the visit. [Verbal consent obtained from patient/other participant(s)] : Verbal consent for telehealth/telephonic services obtained from patient/other participant(s) [Mother] : mother [FreeTextEntry5] : 333pm [FreeTextEntry4] : 318pm [FreeTextEntry2] : 11/23 [FreeTextEntry1] : "I am well, medication works for most part." [TextBox_17] : Patient seen with Mother present who will help with medications going forward

## 2024-03-05 DIAGNOSIS — F41.1 GENERALIZED ANXIETY DISORDER: ICD-10-CM

## 2024-03-05 DIAGNOSIS — F34.0 CYCLOTHYMIC DISORDER: ICD-10-CM

## 2024-03-05 DIAGNOSIS — G47.00 INSOMNIA, UNSPECIFIED: ICD-10-CM

## 2024-04-01 ENCOUNTER — OUTPATIENT (OUTPATIENT)
Dept: OUTPATIENT SERVICES | Facility: HOSPITAL | Age: 24
LOS: 1 days | End: 2024-04-01
Payer: COMMERCIAL

## 2024-04-01 ENCOUNTER — APPOINTMENT (OUTPATIENT)
Dept: PSYCHIATRY | Facility: CLINIC | Age: 24
End: 2024-04-01
Payer: COMMERCIAL

## 2024-04-01 DIAGNOSIS — G47.00 INSOMNIA, UNSPECIFIED: ICD-10-CM

## 2024-04-01 DIAGNOSIS — F41.1 GENERALIZED ANXIETY DISORDER: ICD-10-CM

## 2024-04-01 PROCEDURE — 99214 OFFICE O/P EST MOD 30 MIN: CPT | Mod: 95

## 2024-04-01 NOTE — PAST MEDICAL HISTORY
[FreeTextEntry1] : 20 y/o single M referred by ED following ED visits 6/2/22 and 6/13/22 for worsening anxiety and prescribed hydroxyzine with improvement ("more calm with it"), regular marijuana user, PMH hearing impaired R ear, no reported PPH, saw therapist "for a few sessions" at unknown organization at 19 y/o but discontinued treatment due to "I didn't feel comfortable there," has never before seen psych, currently employed full time at [employer] 1 year, currently domiciled with mother, stepfather (who raised him from birth), and 3   sisters (17 y/o F, 10 y/o F, 9 y/o F) in private apartment (limited relationship with biological father). As per [employer], Mickey originally contacted [facility] OPD 12/2021 and endorsed substance use at that time and was referred to [facility] but Mickey did not follow-up. When asked, Mickey states he last used unprescribed Xanax, Percocet, and acid recreationally 2 years ago, denies any current use (notes while on acid saw colors/lights that has continued and worried about permanency, encouraged to follow up with eye doctor?). Mickey reports "extreme anxiety and anger issues," describing anxiety as excessive worry, worsening when in social situations, "I overthink a lot and the more I overthink I end up in a bad place in my mind and it's difficult to be around people," resulting in self-isolation. Panic episodes once per week described as difficulty breathing, racing thoughts, shakiness, and "felt like I couldn't walk," most recent episode 6/28/22. Mickey reports current mild depressed mood that has improved over the last 2 weeks, decreased appetite, and difficulty sleeping (works night shifts). Mickey notes he broke up in his girlfriend 5/2022 and at that time experienced worsening decreased appetite, 20 lbs unintentional weight loss, excessive crying, loss of motivation "difficulty going to work for 2 weeks," and hopelessness that has since improved due to getting back together recently. (+) recent passive SI "I had thoughts of wanting to hurt myself, I wish I didn't wake up," "it's when I feel like my life isn't going anywhere," none currently, no intent or plan, no hx of SA. Mickey reports episodes of difficulty with emotional regulation "when I don't know how to handle a situation," specifically high stress situations, described as "irritability, explosiveness, and frustration" resulting in physical aggression towards property, no HI. In 2021 he injured his hand by punching a piece of metal. No IPP admissions.

## 2024-04-01 NOTE — SOCIAL HISTORY
[With Family] : lives with family [Employed] : employed [Never ] : never  [High School] : high school [None] : none [Yes] : yes [FreeTextEntry2] : full time at [employer] 1 year [TextBox_7] : social drinker, on special occasions [FreeTextEntry1] : regular marijuana use [FreeTextEntry9] : unprescribed Percocet recreationally, last use 2020 [TextBox_52] : unprescribed Xanax recreationally, last use 2020 [TextBox_62] : acid, last use 2020

## 2024-04-01 NOTE — CURRENT PSYCHIATRIC SYMPTOMS
[Excessive Worry] : excessive worry [Panic] : panic  [Depressed Mood] : no depressed mood [Anhedonia] : no anhedonia [Guilt] : not feeling guilty [Decreased Concentration] : no decrease in concentrating ability [Hyperphagia] : no hyperphagia [Insomnia] : no insomnia disorder [Hypersomnia] : no ~T hypersomnia [Psychomotor Retardation] : no psychomotor retardation [Anorexia] : no anorexia [Euphoria] : no euphoria [Highly Irritable] : no high irritability [Increased Activity] : no increased in activity [Distractibility] : not distracted [Talkativeness] : no talkativeness [Grandeur] : no feelings of grandeur [Buying Sprees] : no buying sprees [Hypersexuality] : denied hypersexuality [Dec Need For Sleep] : no decreased need for sleep [Delusions] : no ~T delusions [Hallucination Visual] : no visual hallucinations [Hallucination Auditory] : no auditory hallucinations [Hallucination Tactile] : no tactile hallucinations [Thought Disorder] : ~T a thought disorder was not noted [Ruminations] : no rumination disorder [Obsessions] : no obsessions [Re-experiencing] : no re-experiencing [Restlessness] : no restlessness [Hypochondriasis] : no hypochondriasis [de-identified] : anxious

## 2024-04-01 NOTE — HISTORY OF PRESENT ILLNESS
[No] : no [None] : none [Responsibility to family or others] : responsibility to family or others [Identifies reasons for living] : identifies reasons for living [Future oriented] : future oriented [Engaged in work or school] : engaged in work or school [None Known] : none known [Yes] : yes [Irritability] : irritability [Residential stability] : residential stability [Relationship stability] : relationship stability [Employment stability] : employment stability [Affective stability] : affective stability [FreeTextEntry1] : This is a 23 year old patient who presents for medication management.  The patient reports fairly stable mood, sleep and appetite.  The patient denies depression, horace, denies psychosis at this time.  The patient has less anxiety that impairs their daily functioning. The patient denies any suicidal ideation, homicidal ideation, no auditory or visual hallucinations, or paranoid ideation.  The patient has no medical complaints. The patient denies any alcohol use, and is now reporting smoking cannabis and cigarettes at this time.  He will try to reduce and stop cannabis use. I received the patient's updated physical and labs from their PCP.  Coping skills were discussed and a safety plan was provided.  The patient was educated on the risks, benefits, and alternatives of their psychiatric medications.  The patient d/c psychotherapy at this time.  The patient consents to ongoing medication management.   2/28:patient called and stated he had a bad panic attack and was crying, risks/benefits discussed, will raise remeron to 15mg at bedtime, patient will call in a few days as needed, and will be seen in 2 weeks, earlier if needed 3/13: raise remeron for ongoing depression, anxiety 3/29:raise gabapentin for anxiety 4/24:raise mirtazapine 5/22:raise abilify 6/26:improved, continue current medication 8/2:raise abilify and add prazosin.  8/29:back to baseline 9/36:raise abilify for mood swings 10/27: raise abilify to 17mg daily for mood lability 11/14:patient had a panic attack and was told by ER doctor "on too much medication."  He requested to lower medication.  After further discussion, it was likely induced by nicotine and missing his gabapentin.  Patient educated again to take all medication as prescribed, to dd/c nicotine/caffeine, and agree to lower abilify back to 15mg daily 11/21:will reduce gabapentin to BID as he has not taking it consistently, will d/c abilify and start olanzapine 2.5mg at bedtime, continue remeron 45mg at bedtime, educated Mother and patient on strict adherence and to abstain from cannabis/nicotine use, and reduce caffeine use 12/12:stable, at baseline 1/9:no changes, at baseline 2/6:remains at baseline 4/1:requests raise in zyprexa for mild mood lability [TextBox_32] : +) recent passive SI "I had thoughts of wanting to hurt myself, I wish I didn't wake up," "it's when I feel like my life isn't going anywhere," none current, no intent or plan, no hx of SA. [de-identified] : \par   [TextBox_52] : +) recent passive SI "I had thoughts of wanting to hurt myself, I wish I didn't wake up," "it's when I feel like my life isn't going anywhere," none current, no intent or plan, no hx of SA.

## 2024-04-01 NOTE — REASON FOR VISIT
[Telehealth (audio & video) - Individual/Group] : This visit was provided via telehealth using real-time 2-way audio visual technology. [Medical Office: ([facility])___] : The provider was located at the medical office in [unfilled]. [Home] : The patient, [unfilled], was located at home, [unfilled], at the time of the visit. [Verbal consent obtained from patient/other participant(s)] : Verbal consent for telehealth/telephonic services obtained from patient/other participant(s) [Mother] : mother [FreeTextEntry5] : [id number] [FreeTextEntry4] : 1230pm [FreeTextEntry2] : 11/23 [FreeTextEntry3] : phone, then video [TextBox_17] : Patient seen with Mother present who will help with medications going forward [FreeTextEntry1] : "I would like to raise the zyprexa for my mood."

## 2024-04-01 NOTE — DISCUSSION/SUMMARY
[Date of Last Physical Exam: _____] : Date of Last Physical Exam: [unfilled] [Date of Last Annual Labs: _____] : Date of Last Annual Labs: [unfilled] [Annual Review of Systems Completed?] : Annual Review of Systems Completed: Yes [Tobacco Screening Completed?] : Tobacco Screening Completed: Yes [Date of Last AIMS: _____] : Date of Last AIMS: [unfilled] [Date of Last HbgA1c: _____] : Date of Last HbgA1c: [unfilled] [Date of Last Lipid Profile: _____] : Date of Last Lipid Profile: [unfilled] [Potential impact of patientâ??s physical health conditions on psychiatric care?] : Potential impact of patient's physical health conditions on psychiatric care: No [Does patient require any additional health services or referrals?] : Does patient require any additional health services or referrals: No [FreeTextEntry1] : Patient has mild mood lability Continue medication: 1. gabapentin 600mg po BID 2. mirtazapine 45mg at bedtime 3. olanzapine 2.5mg at bedtime, raise to 5mg at bedtime  Follow up in 4 weeks, earlier as needed

## 2024-04-02 DIAGNOSIS — F41.1 GENERALIZED ANXIETY DISORDER: ICD-10-CM

## 2024-04-02 DIAGNOSIS — G47.00 INSOMNIA, UNSPECIFIED: ICD-10-CM

## 2024-04-22 ENCOUNTER — NON-APPOINTMENT (OUTPATIENT)
Age: 24
End: 2024-04-22

## 2024-04-29 ENCOUNTER — OUTPATIENT (OUTPATIENT)
Dept: OUTPATIENT SERVICES | Facility: HOSPITAL | Age: 24
LOS: 1 days | End: 2024-04-29
Payer: COMMERCIAL

## 2024-04-29 ENCOUNTER — APPOINTMENT (OUTPATIENT)
Dept: PSYCHIATRY | Facility: CLINIC | Age: 24
End: 2024-04-29
Payer: COMMERCIAL

## 2024-04-29 DIAGNOSIS — F34.0 CYCLOTHYMIC DISORDER: ICD-10-CM

## 2024-04-29 DIAGNOSIS — F41.1 GENERALIZED ANXIETY DISORDER: ICD-10-CM

## 2024-04-29 DIAGNOSIS — G47.00 INSOMNIA, UNSPECIFIED: ICD-10-CM

## 2024-04-29 PROCEDURE — 99214 OFFICE O/P EST MOD 30 MIN: CPT | Mod: 95

## 2024-04-29 NOTE — REASON FOR VISIT
[Telehealth (audio & video) - Individual/Group] : This visit was provided via telehealth using real-time 2-way audio visual technology. [Medical Office: ([facility])___] : The provider was located at the medical office in [unfilled]. [Home] : The patient, [unfilled], was located at home, [unfilled], at the time of the visit. [Verbal consent obtained from patient/other participant(s)] : Verbal consent for telehealth/telephonic services obtained from patient/other participant(s) [Mother] : mother [FreeTextEntry4] : 300pm [FreeTextEntry5] : 315pm [FreeTextEntry2] : 11/23 [FreeTextEntry3] : phone, then video [TextBox_17] : Patient seen with Mother present who will help with medications going forward [FreeTextEntry1] : "I am good."

## 2024-04-29 NOTE — CURRENT PSYCHIATRIC SYMPTOMS
[Excessive Worry] : excessive worry [Panic] : panic  [Depressed Mood] : no depressed mood [Anhedonia] : no anhedonia [Guilt] : not feeling guilty [Decreased Concentration] : no decrease in concentrating ability [Hyperphagia] : no hyperphagia [Insomnia] : no insomnia disorder [Hypersomnia] : no ~T hypersomnia [Psychomotor Retardation] : no psychomotor retardation [Anorexia] : no anorexia [Euphoria] : no euphoria [Highly Irritable] : no high irritability [Increased Activity] : no increased in activity [Distractibility] : not distracted [Talkativeness] : no talkativeness [Grandeur] : no feelings of grandeur [Buying Sprees] : no buying sprees [Hypersexuality] : denied hypersexuality [Dec Need For Sleep] : no decreased need for sleep [Delusions] : no ~T delusions [Hallucination Visual] : no visual hallucinations [Hallucination Auditory] : no auditory hallucinations [Hallucination Tactile] : no tactile hallucinations [Thought Disorder] : ~T a thought disorder was not noted [Ruminations] : no rumination disorder [Obsessions] : no obsessions [Re-experiencing] : no re-experiencing [Restlessness] : no restlessness [Hypochondriasis] : no hypochondriasis [de-identified] : anxious

## 2024-04-29 NOTE — FAMILY HISTORY
[FreeTextEntry1] : Family composition: never , no children\par  Family history and background: raised by mother and stepfather (stepdad since birth), mother and biological father  before his birth, limited relationship with biological father "we talk rarely," 3   sisters (17 y/o F, 10 y/o F, 9 y/o F) \par  Family relationship: supportive/stressful with mother and stepfather, supportive relationships with 1/s sisters "I love them, they're just sister, we're good" \par  Pertinent Family Medical, MH and Substance Use History including Adult Child of Alcoholic and child of substance abuse status; history of cancer and heart disease\par  *feels like mental health hx in family but undiagnosed\par  Step-father - alcohol abuse, not completely sober but has decreased alcohol use recently\par

## 2024-04-29 NOTE — HISTORY OF PRESENT ILLNESS
[No] : no [None] : none [Responsibility to family or others] : responsibility to family or others [Identifies reasons for living] : identifies reasons for living [Future oriented] : future oriented [Engaged in work or school] : engaged in work or school [None Known] : none known [Yes] : yes [Irritability] : irritability [Residential stability] : residential stability [Relationship stability] : relationship stability [Employment stability] : employment stability [Affective stability] : affective stability [FreeTextEntry1] : This is a 23 year old patient who presents for medication management.  The patient reports stable mood, sleep and appetite.  The patient denies depression, horace, denies psychosis at this time.  The patient has less anxiety that impairs their daily functioning. The patient denies any suicidal ideation, homicidal ideation, no auditory or visual hallucinations, or paranoid ideation.  The patient has no medical complaints. The patient denies any alcohol use, and is now reporting smoking cannabis and cigarettes at this time.  He will try to reduce and stop cannabis use. I received the patient's updated physical and labs from their PCP.  Coping skills were discussed and a safety plan was provided.  The patient was educated on the risks, benefits, and alternatives of their psychiatric medications.  The patient d/c psychotherapy at this time.  The patient consents to ongoing medication management.   2/28:patient called and stated he had a bad panic attack and was crying, risks/benefits discussed, will raise remeron to 15mg at bedtime, patient will call in a few days as needed, and will be seen in 2 weeks, earlier if needed 3/13: raise remeron for ongoing depression, anxiety 3/29:raise gabapentin for anxiety 4/24:raise mirtazapine 5/22:raise abilify 6/26:improved, continue current medication 8/2:raise abilify and add prazosin.  8/29:back to baseline 9/36:raise abilify for mood swings 10/27: raise abilify to 17mg daily for mood lability 11/14:patient had a panic attack and was told by ER doctor "on too much medication."  He requested to lower medication.  After further discussion, it was likely induced by nicotine and missing his gabapentin.  Patient educated again to take all medication as prescribed, to dd/c nicotine/caffeine, and agree to lower abilify back to 15mg daily 11/21:will reduce gabapentin to BID as he has not taking it consistently, will d/c abilify and start olanzapine 2.5mg at bedtime, continue remeron 45mg at bedtime, educated Mother and patient on strict adherence and to abstain from cannabis/nicotine use, and reduce caffeine use 12/12:stable, at baseline 1/9:no changes, at baseline 2/6:remains at baseline 4/1:requests raise in zyprexa for mild mood lability 4/29:stable on meds, f/u in 2 months [TextBox_32] : +) recent passive SI "I had thoughts of wanting to hurt myself, I wish I didn't wake up," "it's when I feel like my life isn't going anywhere," none current, no intent or plan, no hx of SA. [de-identified] : \par   [TextBox_52] : +) recent passive SI "I had thoughts of wanting to hurt myself, I wish I didn't wake up," "it's when I feel like my life isn't going anywhere," none current, no intent or plan, no hx of SA.

## 2024-04-29 NOTE — DISCUSSION/SUMMARY
[Date of Last Physical Exam: _____] : Date of Last Physical Exam: [unfilled] [Date of Last Annual Labs: _____] : Date of Last Annual Labs: [unfilled] [Annual Review of Systems Completed?] : Annual Review of Systems Completed: Yes [Tobacco Screening Completed?] : Tobacco Screening Completed: Yes [Date of Last AIMS: _____] : Date of Last AIMS: [unfilled] [Date of Last HbgA1c: _____] : Date of Last HbgA1c: [unfilled] [Date of Last Lipid Profile: _____] : Date of Last Lipid Profile: [unfilled] [Potential impact of patientâ??s physical health conditions on psychiatric care?] : Potential impact of patient's physical health conditions on psychiatric care: No [Does patient require any additional health services or referrals?] : Does patient require any additional health services or referrals: No [FreeTextEntry1] : Patient back to baseline. Continue medication: 1. gabapentin 600mg po BID 2. mirtazapine 45mg at bedtime 3. olanzapine 5mg at bedtime  Follow up in 8 weeks, earlier as needed

## 2024-04-29 NOTE — SOCIAL HISTORY
[With Family] : lives with family [Employed] : employed [Never ] : never  [High School] : high school [None] : none [Yes] : yes [FreeTextEntry2] : full time at [employer] 1 year [TextBox_7] : social drinker, on special occasions [FreeTextEntry1] : regular marijuana use [FreeTextEntry9] : unprescribed Percocet recreationally, last use 2020 [TextBox_52] : unprescribed Xanax recreationally, last use 2020 [TextBox_62] : acid, last use 2020

## 2024-04-29 NOTE — PAST MEDICAL HISTORY
[FreeTextEntry1] : 22 y/o single M referred by ED following ED visits 6/2/22 and 6/13/22 for worsening anxiety and prescribed hydroxyzine with improvement ("more calm with it"), regular marijuana user, PMH hearing impaired R ear, no reported PPH, saw therapist "for a few sessions" at unknown organization at 19 y/o but discontinued treatment due to "I didn't feel comfortable there," has never before seen psych, currently employed full time at [employer] 1 year, currently domiciled with mother, stepfather (who raised him from birth), and 3   sisters (17 y/o F, 10 y/o F, 7 y/o F) in private apartment (limited relationship with biological father). As per [employer], Mickey originally contacted [facility] OPD 12/2021 and endorsed substance use at that time and was referred to [facility] but Mickey did not follow-up. When asked, Mickey states he last used unprescribed Xanax, Percocet, and acid recreationally 2 years ago, denies any current use (notes while on acid saw colors/lights that has continued and worried about permanency, encouraged to follow up with eye doctor?). Mickey reports "extreme anxiety and anger issues," describing anxiety as excessive worry, worsening when in social situations, "I overthink a lot and the more I overthink I end up in a bad place in my mind and it's difficult to be around people," resulting in self-isolation. Panic episodes once per week described as difficulty breathing, racing thoughts, shakiness, and "felt like I couldn't walk," most recent episode 6/28/22. Mickey reports current mild depressed mood that has improved over the last 2 weeks, decreased appetite, and difficulty sleeping (works night shifts). Mickey notes he broke up in his girlfriend 5/2022 and at that time experienced worsening decreased appetite, 20 lbs unintentional weight loss, excessive crying, loss of motivation "difficulty going to work for 2 weeks," and hopelessness that has since improved due to getting back together recently. (+) recent passive SI "I had thoughts of wanting to hurt myself, I wish I didn't wake up," "it's when I feel like my life isn't going anywhere," none currently, no intent or plan, no hx of SA. Mickey reports episodes of difficulty with emotional regulation "when I don't know how to handle a situation," specifically high stress situations, described as "irritability, explosiveness, and frustration" resulting in physical aggression towards property, no HI. In 2021 he injured his hand by punching a piece of metal. No IPP admissions.

## 2024-04-30 DIAGNOSIS — F34.0 CYCLOTHYMIC DISORDER: ICD-10-CM

## 2024-04-30 DIAGNOSIS — G47.00 INSOMNIA, UNSPECIFIED: ICD-10-CM

## 2024-04-30 DIAGNOSIS — F41.1 GENERALIZED ANXIETY DISORDER: ICD-10-CM

## 2024-05-04 NOTE — ED ADULT NURSE NOTE - ADDITIONAL DETAILS / COMMENTS
- Poor compliance in the past with medications as per cardiologist note  - Strong family history of hypertension.  - Outpatient on amlodipine 10mg, carvedilol 25mg bid, hydralazine 50mg bid and on entresto 97/103mg for HF       - Continue with outpatient medications   Pt. has good insight

## 2024-06-24 ENCOUNTER — APPOINTMENT (OUTPATIENT)
Dept: PSYCHIATRY | Facility: CLINIC | Age: 24
End: 2024-06-24
Payer: COMMERCIAL

## 2024-06-24 ENCOUNTER — OUTPATIENT (OUTPATIENT)
Dept: OUTPATIENT SERVICES | Facility: HOSPITAL | Age: 24
LOS: 1 days | End: 2024-06-24
Payer: COMMERCIAL

## 2024-06-24 DIAGNOSIS — G47.00 INSOMNIA, UNSPECIFIED: ICD-10-CM

## 2024-06-24 DIAGNOSIS — F41.1 GENERALIZED ANXIETY DISORDER: ICD-10-CM

## 2024-06-24 DIAGNOSIS — F34.0 CYCLOTHYMIC DISORDER: ICD-10-CM

## 2024-06-24 PROCEDURE — 99214 OFFICE O/P EST MOD 30 MIN: CPT | Mod: 95

## 2024-06-24 RX ORDER — MIRTAZAPINE 45 MG/1
45 TABLET, FILM COATED ORAL
Qty: 30 | Refills: 1 | Status: ACTIVE | COMMUNITY
Start: 2023-02-15 | End: 1900-01-01

## 2024-06-24 RX ORDER — OLANZAPINE 10 MG/1
10 TABLET, FILM COATED ORAL
Qty: 30 | Refills: 2 | Status: ACTIVE | COMMUNITY
Start: 2023-11-21 | End: 1900-01-01

## 2024-06-24 RX ORDER — GABAPENTIN 600 MG/1
600 TABLET, COATED ORAL TWICE DAILY
Qty: 60 | Refills: 1 | Status: ACTIVE | COMMUNITY
Start: 2023-03-29 | End: 1900-01-01

## 2024-06-25 DIAGNOSIS — F41.1 GENERALIZED ANXIETY DISORDER: ICD-10-CM

## 2024-06-25 DIAGNOSIS — G47.00 INSOMNIA, UNSPECIFIED: ICD-10-CM

## 2024-07-22 ENCOUNTER — APPOINTMENT (OUTPATIENT)
Dept: PSYCHIATRY | Facility: CLINIC | Age: 24
End: 2024-07-22
Payer: COMMERCIAL

## 2024-07-22 DIAGNOSIS — F34.0 CYCLOTHYMIC DISORDER: ICD-10-CM

## 2024-07-22 DIAGNOSIS — F41.1 GENERALIZED ANXIETY DISORDER: ICD-10-CM

## 2024-07-22 DIAGNOSIS — G47.00 INSOMNIA, UNSPECIFIED: ICD-10-CM

## 2024-07-22 PROCEDURE — 99214 OFFICE O/P EST MOD 30 MIN: CPT | Mod: 95

## 2024-07-22 NOTE — PAST MEDICAL HISTORY
[FreeTextEntry1] : 22 y/o single M referred by ED following ED visits 6/2/22 and 6/13/22 for worsening anxiety and prescribed hydroxyzine with improvement ("more calm with it"), regular marijuana user, PMH hearing impaired R ear, no reported PPH, saw therapist "for a few sessions" at unknown organization at 19 y/o but discontinued treatment due to "I didn't feel comfortable there," has never before seen psych, currently employed full time at [employer] 1 year, currently domiciled with mother, stepfather (who raised him from birth), and 3   sisters (15 y/o F, 10 y/o F, 7 y/o F) in private apartment (limited relationship with biological father). As per [employer], Mickey originally contacted [facility] OPD 12/2021 and endorsed substance use at that time and was referred to [facility] but Mickey did not follow-up. When asked, Mickey states he last used unprescribed Xanax, Percocet, and acid recreationally 2 years ago, denies any current use (notes while on acid saw colors/lights that has continued and worried about permanency, encouraged to follow up with eye doctor?). Mickey reports "extreme anxiety and anger issues," describing anxiety as excessive worry, worsening when in social situations, "I overthink a lot and the more I overthink I end up in a bad place in my mind and it's difficult to be around people," resulting in self-isolation. Panic episodes once per week described as difficulty breathing, racing thoughts, shakiness, and "felt like I couldn't walk," most recent episode 6/28/22. Mickey reports current mild depressed mood that has improved over the last 2 weeks, decreased appetite, and difficulty sleeping (works night shifts). Mickey notes he broke up in his girlfriend 5/2022 and at that time experienced worsening decreased appetite, 20 lbs unintentional weight loss, excessive crying, loss of motivation "difficulty going to work for 2 weeks," and hopelessness that has since improved due to getting back together recently. (+) recent passive SI "I had thoughts of wanting to hurt myself, I wish I didn't wake up," "it's when I feel like my life isn't going anywhere," none currently, no intent or plan, no hx of SA. Mickey reports episodes of difficulty with emotional regulation "when I don't know how to handle a situation," specifically high stress situations, described as "irritability, explosiveness, and frustration" resulting in physical aggression towards property, no HI. In 2021 he injured his hand by punching a piece of metal. No IPP admissions.

## 2024-07-22 NOTE — CURRENT PSYCHIATRIC SYMPTOMS
[Excessive Worry] : excessive worry [Panic] : panic  [Depressed Mood] : no depressed mood [Anhedonia] : no anhedonia [Guilt] : not feeling guilty [Decreased Concentration] : no decrease in concentrating ability [Hyperphagia] : no hyperphagia [Insomnia] : no insomnia disorder [Hypersomnia] : no ~T hypersomnia [Psychomotor Retardation] : no psychomotor retardation [Anorexia] : no anorexia [Euphoria] : no euphoria [Highly Irritable] : no high irritability [Increased Activity] : no increased in activity [Distractibility] : not distracted [Talkativeness] : no talkativeness [Grandeur] : no feelings of grandeur [Buying Sprees] : no buying sprees [Hypersexuality] : denied hypersexuality [Dec Need For Sleep] : no decreased need for sleep [Delusions] : no ~T delusions [Hallucination Visual] : no visual hallucinations [Hallucination Auditory] : no auditory hallucinations [Hallucination Tactile] : no tactile hallucinations [Thought Disorder] : ~T a thought disorder was not noted [Ruminations] : no rumination disorder [Obsessions] : no obsessions [Re-experiencing] : no re-experiencing [Restlessness] : no restlessness [Hypochondriasis] : no hypochondriasis [de-identified] : anxious

## 2024-07-22 NOTE — DISCUSSION/SUMMARY
[Date of Last Physical Exam: _____] : Date of Last Physical Exam: [unfilled] [Date of Last Annual Labs: _____] : Date of Last Annual Labs: [unfilled] [Annual Review of Systems Completed?] : Annual Review of Systems Completed: Yes [Tobacco Screening Completed?] : Tobacco Screening Completed: Yes [Date of Last AIMS: _____] : Date of Last AIMS: [unfilled] [Date of Last HbgA1c: _____] : Date of Last HbgA1c: [unfilled] [Date of Last Lipid Profile: _____] : Date of Last Lipid Profile: [unfilled] [Potential impact of patientâ??s physical health conditions on psychiatric care?] : Potential impact of patient's physical health conditions on psychiatric care: No [Does patient require any additional health services or referrals?] : Does patient require any additional health services or referrals: No [FreeTextEntry1] : Patient at baseline. Continue medication: 1. gabapentin 600mg po BID 2. mirtazapine 45mg at bedtime 3. olanzapine 10mg at bedtime  Follow up in 8 weeks, earlier as needed

## 2024-07-22 NOTE — PLAN
[FreeTextEntry4] : mood is stable anxiety is low  health is good, will update physical and labs  working and going to school  elected to d/c therapy

## 2024-07-22 NOTE — PHYSICAL EXAM
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

## 2024-07-22 NOTE — SOCIAL HISTORY
[With Family] : lives with family [Employed] : employed [Never ] : never  [High School] : high school [None] : none [Yes] : yes [FreeTextEntry2] : full time at [employer] 1 year [TextBox_7] : social drinker, on special occasions [FreeTextEntry1] : regular marijuana use [FreeTextEntry9] : unprescribed Percocet recreationally, last use 2020 [TextBox_52] : unprescribed Xanax recreationally, last use 2020 [TextBox_62] : acid, last use 2020

## 2024-07-22 NOTE — HISTORY OF PRESENT ILLNESS
[No] : no [None] : none [Responsibility to family or others] : responsibility to family or others [Identifies reasons for living] : identifies reasons for living [Future oriented] : future oriented [Engaged in work or school] : engaged in work or school [None Known] : none known [Yes] : yes [Irritability] : irritability [Residential stability] : residential stability [Relationship stability] : relationship stability [Employment stability] : employment stability [Affective stability] : affective stability [FreeTextEntry1] : This is a 23 year old patient who presents for medication management.  The patient reports stable mood, sleep and appetite.  The patient denies other symptoms of depression, horace, denies psychosis at this time.  The patient has less anxiety that impairs their daily functioning. The patient denies any suicidal ideation, homicidal ideation, no auditory or visual hallucinations, or paranoid ideation.  The patient has no medical complaints. The patient denies any alcohol use, and is now reporting smoking cannabis and cigarettes at this time.  He will try to reduce and stop cannabis use. I received the patient's updated physical and labs from their PCP.  Coping skills were discussed and a safety plan was provided.  The patient was educated on the risks, benefits, and alternatives of their psychiatric medications.  The patient d/c psychotherapy at this time.  The patient consents to ongoing medication management.   2/28:patient called and stated he had a bad panic attack and was crying, risks/benefits discussed, will raise remeron to 15mg at bedtime, patient will call in a few days as needed, and will be seen in 2 weeks, earlier if needed 3/13: raise remeron for ongoing depression, anxiety 3/29:raise gabapentin for anxiety 4/24:raise mirtazapine 5/22:raise abilify 6/26:improved, continue current medication 8/2:raise abilify and add prazosin.  8/29:back to baseline 9/36:raise abilify for mood swings 10/27: raise abilify to 17mg daily for mood lability 11/14:patient had a panic attack and was told by ER doctor "on too much medication."  He requested to lower medication.  After further discussion, it was likely induced by nicotine and missing his gabapentin.  Patient educated again to take all medication as prescribed, to dd/c nicotine/caffeine, and agree to lower abilify back to 15mg daily 11/21:will reduce gabapentin to BID as he has not taking it consistently, will d/c abilify and start olanzapine 2.5mg at bedtime, continue remeron 45mg at bedtime, educated Mother and patient on strict adherence and to abstain from cannabis/nicotine use, and reduce caffeine use 12/12:stable, at baseline 1/9:no changes, at baseline 2/6:remains at baseline 4/1:requests raise in zyprexa for mild mood lability 4/29:stable on meds, f/u in 2 months 6/24: 4/1:requests raise in zyprexa for mild mood lability, risks, benefits discussed 7/22:at baseline [TextBox_32] : +) recent passive SI "I had thoughts of wanting to hurt myself, I wish I didn't wake up," "it's when I feel like my life isn't going anywhere," none current, no intent or plan, no hx of SA. [de-identified] : \par   [TextBox_52] : +) recent passive SI "I had thoughts of wanting to hurt myself, I wish I didn't wake up," "it's when I feel like my life isn't going anywhere," none current, no intent or plan, no hx of SA.

## 2024-07-22 NOTE — REASON FOR VISIT
[Telehealth (audio & video) - Individual/Group] : This visit was provided via telehealth using real-time 2-way audio visual technology. [Medical Office: ([facility])___] : The provider was located at the medical office in [unfilled]. [Home] : The patient, [unfilled], was located at home, [unfilled], at the time of the visit. [Verbal consent obtained from patient/other participant(s)] : Verbal consent for telehealth/telephonic services obtained from patient/other participant(s) [Mother] : mother [FreeTextEntry4] : 300pm [FreeTextEntry5] : 315pm [FreeTextEntry2] : 11/23 [TextBox_17] : Patient seen with Mother present who will help with medications going forward [FreeTextEntry1] : "I am ready to go back to work."

## 2024-09-16 ENCOUNTER — APPOINTMENT (OUTPATIENT)
Dept: PSYCHIATRY | Facility: CLINIC | Age: 24
End: 2024-09-16

## 2024-09-23 ENCOUNTER — APPOINTMENT (OUTPATIENT)
Dept: PSYCHIATRY | Facility: CLINIC | Age: 24
End: 2024-09-23
Payer: COMMERCIAL

## 2024-09-23 ENCOUNTER — OUTPATIENT (OUTPATIENT)
Dept: OUTPATIENT SERVICES | Facility: HOSPITAL | Age: 24
LOS: 1 days | End: 2024-09-23
Payer: COMMERCIAL

## 2024-09-23 DIAGNOSIS — F41.1 GENERALIZED ANXIETY DISORDER: ICD-10-CM

## 2024-09-23 DIAGNOSIS — F34.0 CYCLOTHYMIC DISORDER: ICD-10-CM

## 2024-09-23 DIAGNOSIS — G47.00 INSOMNIA, UNSPECIFIED: ICD-10-CM

## 2024-09-23 PROCEDURE — 99214 OFFICE O/P EST MOD 30 MIN: CPT | Mod: 95

## 2024-09-24 DIAGNOSIS — G47.00 INSOMNIA, UNSPECIFIED: ICD-10-CM

## 2024-09-24 DIAGNOSIS — F41.1 GENERALIZED ANXIETY DISORDER: ICD-10-CM

## 2024-09-24 DIAGNOSIS — F34.0 CYCLOTHYMIC DISORDER: ICD-10-CM

## 2024-09-24 NOTE — CURRENT PSYCHIATRIC SYMPTOMS
[Excessive Worry] : excessive worry [Panic] : panic  [Depressed Mood] : no depressed mood [Anhedonia] : no anhedonia [Guilt] : not feeling guilty [Decreased Concentration] : no decrease in concentrating ability [Hyperphagia] : no hyperphagia [Insomnia] : no insomnia disorder [Hypersomnia] : no ~T hypersomnia [Psychomotor Retardation] : no psychomotor retardation [Anorexia] : no anorexia [Euphoria] : no euphoria [Highly Irritable] : no high irritability [Increased Activity] : no increased in activity [Distractibility] : not distracted [Talkativeness] : no talkativeness [Grandeur] : no feelings of grandeur [Buying Sprees] : no buying sprees [Hypersexuality] : denied hypersexuality [Dec Need For Sleep] : no decreased need for sleep [Delusions] : no ~T delusions [Hallucination Visual] : no visual hallucinations [Hallucination Auditory] : no auditory hallucinations [Hallucination Tactile] : no tactile hallucinations [Thought Disorder] : ~T a thought disorder was not noted [Ruminations] : no rumination disorder [Obsessions] : no obsessions [Re-experiencing] : no re-experiencing [Restlessness] : no restlessness [Hypochondriasis] : no hypochondriasis [de-identified] : anxious

## 2024-09-24 NOTE — PAST MEDICAL HISTORY
[FreeTextEntry1] : 20 y/o single M referred by ED following ED visits 6/2/22 and 6/13/22 for worsening anxiety and prescribed hydroxyzine with improvement ("more calm with it"), regular marijuana user, PMH hearing impaired R ear, no reported PPH, saw therapist "for a few sessions" at unknown organization at 19 y/o but discontinued treatment due to "I didn't feel comfortable there," has never before seen psych, currently employed full time at [employer] 1 year, currently domiciled with mother, stepfather (who raised him from birth), and 3   sisters (15 y/o F, 10 y/o F, 7 y/o F) in private apartment (limited relationship with biological father). As per [employer], Mickey originally contacted [facility] OPD 12/2021 and endorsed substance use at that time and was referred to [facility] but Mickey did not follow-up. When asked, Mickey states he last used unprescribed Xanax, Percocet, and acid recreationally 2 years ago, denies any current use (notes while on acid saw colors/lights that has continued and worried about permanency, encouraged to follow up with eye doctor?). Mickey reports "extreme anxiety and anger issues," describing anxiety as excessive worry, worsening when in social situations, "I overthink a lot and the more I overthink I end up in a bad place in my mind and it's difficult to be around people," resulting in self-isolation. Panic episodes once per week described as difficulty breathing, racing thoughts, shakiness, and "felt like I couldn't walk," most recent episode 6/28/22. Mickey reports current mild depressed mood that has improved over the last 2 weeks, decreased appetite, and difficulty sleeping (works night shifts). Mickey notes he broke up in his girlfriend 5/2022 and at that time experienced worsening decreased appetite, 20 lbs unintentional weight loss, excessive crying, loss of motivation "difficulty going to work for 2 weeks," and hopelessness that has since improved due to getting back together recently. (+) recent passive SI "I had thoughts of wanting to hurt myself, I wish I didn't wake up," "it's when I feel like my life isn't going anywhere," none currently, no intent or plan, no hx of SA. Mickey reports episodes of difficulty with emotional regulation "when I don't know how to handle a situation," specifically high stress situations, described as "irritability, explosiveness, and frustration" resulting in physical aggression towards property, no HI. In 2021 he injured his hand by punching a piece of metal. No IPP admissions.

## 2024-09-24 NOTE — SOCIAL HISTORY
[With Family] : lives with family [Employed] : employed [Never ] : never  [High School] : high school [None] : none [Yes] : yes [FreeTextEntry2] : full time at [employer] 1 year [TextBox_7] : social drinker, on special occasions [FreeTextEntry1] : regular marijuana use [FreeTextEntry9] : unprescribed Percocet recreationally, last use 2020 [TextBox_52] : unprescribed Xanax recreationally, last use 2020 [TextBox_62] : acid, last use 2020

## 2024-09-24 NOTE — SOCIAL HISTORY
[With Family] : lives with family [Employed] : employed [Never ] : never  [High School] : high school [None] : none [Yes] : yes [FreeTextEntry2] : full time at [employer] 1 year [TextBox_7] : social drinker, on special occasions [FreeTextEntry1] : regular marijuana use [FreeTextEntry9] : unprescribed Percocet recreationally, last use 2020 [TextBox_52] : unprescribed Xanax recreationally, last use 2020 [TextBox_62] : acid, last use 2020

## 2024-09-24 NOTE — PAST MEDICAL HISTORY
[FreeTextEntry1] : 20 y/o single M referred by ED following ED visits 6/2/22 and 6/13/22 for worsening anxiety and prescribed hydroxyzine with improvement ("more calm with it"), regular marijuana user, PMH hearing impaired R ear, no reported PPH, saw therapist "for a few sessions" at unknown organization at 17 y/o but discontinued treatment due to "I didn't feel comfortable there," has never before seen psych, currently employed full time at [employer] 1 year, currently domiciled with mother, stepfather (who raised him from birth), and 3   sisters (17 y/o F, 10 y/o F, 9 y/o F) in private apartment (limited relationship with biological father). As per [employer], Mickey originally contacted [facility] OPD 12/2021 and endorsed substance use at that time and was referred to [facility] but Mickey did not follow-up. When asked, Mickey states he last used unprescribed Xanax, Percocet, and acid recreationally 2 years ago, denies any current use (notes while on acid saw colors/lights that has continued and worried about permanency, encouraged to follow up with eye doctor?). Mickey reports "extreme anxiety and anger issues," describing anxiety as excessive worry, worsening when in social situations, "I overthink a lot and the more I overthink I end up in a bad place in my mind and it's difficult to be around people," resulting in self-isolation. Panic episodes once per week described as difficulty breathing, racing thoughts, shakiness, and "felt like I couldn't walk," most recent episode 6/28/22. Mickey reports current mild depressed mood that has improved over the last 2 weeks, decreased appetite, and difficulty sleeping (works night shifts). Mickey notes he broke up in his girlfriend 5/2022 and at that time experienced worsening decreased appetite, 20 lbs unintentional weight loss, excessive crying, loss of motivation "difficulty going to work for 2 weeks," and hopelessness that has since improved due to getting back together recently. (+) recent passive SI "I had thoughts of wanting to hurt myself, I wish I didn't wake up," "it's when I feel like my life isn't going anywhere," none currently, no intent or plan, no hx of SA. Mickey reports episodes of difficulty with emotional regulation "when I don't know how to handle a situation," specifically high stress situations, described as "irritability, explosiveness, and frustration" resulting in physical aggression towards property, no HI. In 2021 he injured his hand by punching a piece of metal. No IPP admissions.

## 2024-09-24 NOTE — CURRENT PSYCHIATRIC SYMPTOMS
[Excessive Worry] : excessive worry [Panic] : panic  [Depressed Mood] : no depressed mood [Anhedonia] : no anhedonia [Guilt] : not feeling guilty [Decreased Concentration] : no decrease in concentrating ability [Hyperphagia] : no hyperphagia [Insomnia] : no insomnia disorder [Hypersomnia] : no ~T hypersomnia [Psychomotor Retardation] : no psychomotor retardation [Anorexia] : no anorexia [Euphoria] : no euphoria [Highly Irritable] : no high irritability [Increased Activity] : no increased in activity [Distractibility] : not distracted [Talkativeness] : no talkativeness [Grandeur] : no feelings of grandeur [Buying Sprees] : no buying sprees [Hypersexuality] : denied hypersexuality [Dec Need For Sleep] : no decreased need for sleep [Delusions] : no ~T delusions [Hallucination Visual] : no visual hallucinations [Hallucination Auditory] : no auditory hallucinations [Hallucination Tactile] : no tactile hallucinations [Thought Disorder] : ~T a thought disorder was not noted [Ruminations] : no rumination disorder [Obsessions] : no obsessions [Re-experiencing] : no re-experiencing [Restlessness] : no restlessness [Hypochondriasis] : no hypochondriasis [de-identified] : anxious

## 2024-09-24 NOTE — HISTORY OF PRESENT ILLNESS
[No] : no [None] : none [Responsibility to family or others] : responsibility to family or others [Identifies reasons for living] : identifies reasons for living [Future oriented] : future oriented [Engaged in work or school] : engaged in work or school [None Known] : none known [Yes] : yes [Irritability] : irritability [Residential stability] : residential stability [Relationship stability] : relationship stability [Employment stability] : employment stability [Affective stability] : affective stability [FreeTextEntry1] : This is a 23 year old patient who presents for medication management.  The patient reports stable mood, sleep and appetite.  The patient denies other symptoms of depression, ohrace, denies psychosis at this time.  The patient has less anxiety that impairs their daily functioning. The patient denies any suicidal ideation, homicidal ideation, no auditory or visual hallucinations, or paranoid ideation.  The patient has no medical complaints. The patient denies any alcohol use, and is now reporting smoking cannabis and cigarettes at this time.  He will try to reduce and stop cannabis use. I received the patient's updated physical and labs from their PCP.  Coping skills were discussed and a safety plan was provided.  The patient was educated on the risks, benefits, and alternatives of their psychiatric medications.  The patient d/c psychotherapy at this time.  The patient consents to ongoing medication management.   2/28:patient called and stated he had a bad panic attack and was crying, risks/benefits discussed, will raise remeron to 15mg at bedtime, patient will call in a few days as needed, and will be seen in 2 weeks, earlier if needed 3/13: raise remeron for ongoing depression, anxiety 3/29:raise gabapentin for anxiety 4/24:raise mirtazapine 5/22:raise abilify 6/26:improved, continue current medication 8/2:raise abilify and add prazosin.  8/29:back to baseline 9/36:raise abilify for mood swings 10/27: raise abilify to 17mg daily for mood lability 11/14:patient had a panic attack and was told by ER doctor "on too much medication."  He requested to lower medication.  After further discussion, it was likely induced by nicotine and missing his gabapentin.  Patient educated again to take all medication as prescribed, to dd/c nicotine/caffeine, and agree to lower abilify back to 15mg daily 11/21:will reduce gabapentin to BID as he has not taking it consistently, will d/c abilify and start olanzapine 2.5mg at bedtime, continue remeron 45mg at bedtime, educated Mother and patient on strict adherence and to abstain from cannabis/nicotine use, and reduce caffeine use 12/12:stable, at baseline 1/9:no changes, at baseline 2/6:remains at baseline 4/1:requests raise in zyprexa for mild mood lability 4/29:stable on meds, f/u in 2 months 6/24: 4/1:requests raise in zyprexa for mild mood lability, risks, benefits discussed 7/22:at baseline [TextBox_32] : +) recent passive SI "I had thoughts of wanting to hurt myself, I wish I didn't wake up," "it's when I feel like my life isn't going anywhere," none current, no intent or plan, no hx of SA. [de-identified] : \par   [TextBox_52] : +) recent passive SI "I had thoughts of wanting to hurt myself, I wish I didn't wake up," "it's when I feel like my life isn't going anywhere," none current, no intent or plan, no hx of SA.

## 2024-09-24 NOTE — PHYSICAL EXAM
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

## 2024-09-24 NOTE — REASON FOR VISIT
[Telehealth (audio & video) - Individual/Group] : This visit was provided via telehealth using real-time 2-way audio visual technology. [Medical Office: ([facility])___] : The provider was located at the medical office in [unfilled]. [Home] : The patient, [unfilled], was located at home, [unfilled], at the time of the visit. [Verbal consent obtained from patient/other participant(s)] : Verbal consent for telehealth/telephonic services obtained from patient/other participant(s) [Mother] : mother [FreeTextEntry4] : 300pm [FreeTextEntry5] : 315pm [FreeTextEntry2] : 11/23 [TextBox_17] : Patient seen with Mother present who will help with medications going forward [FreeTextEntry1] : "I am good."

## 2024-09-24 NOTE — DISCUSSION/SUMMARY
[Date of Last Physical Exam: _____] : Date of Last Physical Exam: [unfilled] [Date of Last Annual Labs: _____] : Date of Last Annual Labs: [unfilled] [Annual Review of Systems Completed?] : Annual Review of Systems Completed: Yes [Tobacco Screening Completed?] : Tobacco Screening Completed: Yes [Date of Last AIMS: _____] : Date of Last AIMS: [unfilled] [Date of Last HbgA1c: _____] : Date of Last HbgA1c: [unfilled] [Date of Last Lipid Profile: _____] : Date of Last Lipid Profile: [unfilled] [Potential impact of patient’s physical health conditions on psychiatric care?] : Potential impact of patient's physical health conditions on psychiatric care: No [Potential impact of patientâ??s physical health conditions on psychiatric care?] : Potential impact of patient's physical health conditions on psychiatric care: No [Does patient require any additional health services or referrals?] : Does patient require any additional health services or referrals: No [FreeTextEntry1] : Patient at baseline. Continue medication: 1. gabapentin 600mg po BID 2. mirtazapine 45mg at bedtime 3. olanzapine 10mg at bedtime  Follow up in 8 weeks, earlier as needed

## 2024-09-24 NOTE — HISTORY OF PRESENT ILLNESS
No [No] : no [None] : none [Responsibility to family or others] : responsibility to family or others [Identifies reasons for living] : identifies reasons for living [Future oriented] : future oriented [Engaged in work or school] : engaged in work or school [None Known] : none known [Yes] : yes [Irritability] : irritability [Residential stability] : residential stability [Relationship stability] : relationship stability [Employment stability] : employment stability [Affective stability] : affective stability [FreeTextEntry1] : This is a 23 year old patient who presents for medication management.  The patient reports stable mood, sleep and appetite.  The patient denies other symptoms of depression, horace, denies psychosis at this time.  The patient has less anxiety that impairs their daily functioning. The patient denies any suicidal ideation, homicidal ideation, no auditory or visual hallucinations, or paranoid ideation.  The patient has no medical complaints. The patient denies any alcohol use, and is now reporting smoking cannabis and cigarettes at this time.  He will try to reduce and stop cannabis use. I received the patient's updated physical and labs from their PCP.  Coping skills were discussed and a safety plan was provided.  The patient was educated on the risks, benefits, and alternatives of their psychiatric medications.  The patient d/c psychotherapy at this time.  The patient consents to ongoing medication management.   2/28:patient called and stated he had a bad panic attack and was crying, risks/benefits discussed, will raise remeron to 15mg at bedtime, patient will call in a few days as needed, and will be seen in 2 weeks, earlier if needed 3/13: raise remeron for ongoing depression, anxiety 3/29:raise gabapentin for anxiety 4/24:raise mirtazapine 5/22:raise abilify 6/26:improved, continue current medication 8/2:raise abilify and add prazosin.  8/29:back to baseline 9/36:raise abilify for mood swings 10/27: raise abilify to 17mg daily for mood lability 11/14:patient had a panic attack and was told by ER doctor "on too much medication."  He requested to lower medication.  After further discussion, it was likely induced by nicotine and missing his gabapentin.  Patient educated again to take all medication as prescribed, to dd/c nicotine/caffeine, and agree to lower abilify back to 15mg daily 11/21:will reduce gabapentin to BID as he has not taking it consistently, will d/c abilify and start olanzapine 2.5mg at bedtime, continue remeron 45mg at bedtime, educated Mother and patient on strict adherence and to abstain from cannabis/nicotine use, and reduce caffeine use 12/12:stable, at baseline 1/9:no changes, at baseline 2/6:remains at baseline 4/1:requests raise in zyprexa for mild mood lability 4/29:stable on meds, f/u in 2 months 6/24: 4/1:requests raise in zyprexa for mild mood lability, risks, benefits discussed 7/22:at baseline [TextBox_32] : +) recent passive SI "I had thoughts of wanting to hurt myself, I wish I didn't wake up," "it's when I feel like my life isn't going anywhere," none current, no intent or plan, no hx of SA. [de-identified] : \par   [TextBox_52] : +) recent passive SI "I had thoughts of wanting to hurt myself, I wish I didn't wake up," "it's when I feel like my life isn't going anywhere," none current, no intent or plan, no hx of SA. none of the above

## 2024-09-24 NOTE — REASON FOR VISIT
[Telehealth (audio & video) - Individual/Group] : This visit was provided via telehealth using real-time 2-way audio visual technology. [Medical Office: ([facility])___] : The provider was located at the medical office in [unfilled]. [Home] : The patient, [unfilled], was located at home, [unfilled], at the time of the visit. [Verbal consent obtained from patient/other participant(s)] : Verbal consent for telehealth/telephonic services obtained from patient/other participant(s) [Mother] : mother [FreeTextEntry4] : 300pm [FreeTextEntry5] : 315pm [FreeTextEntry2] : 11/23 [TextBox_17] : Patient seen with Mother present who will help with medications going forward [FreeTextEntry1] : "I am good."

## 2024-11-06 DIAGNOSIS — F90.0 ATTENTION-DEFICIT HYPERACTIVITY DISORDER, PREDOMINANTLY INATTENTIVE TYPE: ICD-10-CM

## 2024-11-06 RX ORDER — ATOMOXETINE 40 MG/1
40 CAPSULE ORAL
Qty: 30 | Refills: 1 | Status: ACTIVE | COMMUNITY
Start: 2024-11-06 | End: 1900-01-01

## 2024-11-18 ENCOUNTER — OUTPATIENT (OUTPATIENT)
Dept: OUTPATIENT SERVICES | Facility: HOSPITAL | Age: 24
LOS: 1 days | End: 2024-11-18
Payer: COMMERCIAL

## 2024-11-18 ENCOUNTER — APPOINTMENT (OUTPATIENT)
Dept: PSYCHIATRY | Facility: CLINIC | Age: 24
End: 2024-11-18
Payer: COMMERCIAL

## 2024-11-18 DIAGNOSIS — G47.00 INSOMNIA, UNSPECIFIED: ICD-10-CM

## 2024-11-18 DIAGNOSIS — F41.1 GENERALIZED ANXIETY DISORDER: ICD-10-CM

## 2024-11-18 DIAGNOSIS — F90.0 ATTENTION-DEFICIT HYPERACTIVITY DISORDER, PREDOMINANTLY INATTENTIVE TYPE: ICD-10-CM

## 2024-11-18 DIAGNOSIS — F34.0 CYCLOTHYMIC DISORDER: ICD-10-CM

## 2024-11-18 PROCEDURE — 99214 OFFICE O/P EST MOD 30 MIN: CPT | Mod: 95

## 2024-11-19 DIAGNOSIS — G47.00 INSOMNIA, UNSPECIFIED: ICD-10-CM

## 2024-11-19 DIAGNOSIS — F41.1 GENERALIZED ANXIETY DISORDER: ICD-10-CM

## 2024-11-19 DIAGNOSIS — F90.0 ATTENTION-DEFICIT HYPERACTIVITY DISORDER, PREDOMINANTLY INATTENTIVE TYPE: ICD-10-CM

## 2024-11-19 DIAGNOSIS — F34.0 CYCLOTHYMIC DISORDER: ICD-10-CM

## 2025-01-13 ENCOUNTER — APPOINTMENT (OUTPATIENT)
Dept: PSYCHIATRY | Facility: CLINIC | Age: 25
End: 2025-01-13

## 2025-01-13 ENCOUNTER — NON-APPOINTMENT (OUTPATIENT)
Age: 25
End: 2025-01-13

## 2025-01-17 ENCOUNTER — OUTPATIENT (OUTPATIENT)
Dept: OUTPATIENT SERVICES | Facility: HOSPITAL | Age: 25
LOS: 1 days | End: 2025-01-17
Payer: COMMERCIAL

## 2025-01-17 ENCOUNTER — APPOINTMENT (OUTPATIENT)
Dept: PSYCHIATRY | Facility: CLINIC | Age: 25
End: 2025-01-17
Payer: COMMERCIAL

## 2025-01-17 DIAGNOSIS — G47.00 INSOMNIA, UNSPECIFIED: ICD-10-CM

## 2025-01-17 DIAGNOSIS — F41.1 GENERALIZED ANXIETY DISORDER: ICD-10-CM

## 2025-01-17 DIAGNOSIS — F90.0 ATTENTION-DEFICIT HYPERACTIVITY DISORDER, PREDOMINANTLY INATTENTIVE TYPE: ICD-10-CM

## 2025-01-17 DIAGNOSIS — F34.0 CYCLOTHYMIC DISORDER: ICD-10-CM

## 2025-01-17 PROCEDURE — 99214 OFFICE O/P EST MOD 30 MIN: CPT | Mod: 95

## 2025-01-17 PROCEDURE — 98007 SYNCH AUDIO-VIDEO EST HI 40: CPT

## 2025-01-18 DIAGNOSIS — F41.1 GENERALIZED ANXIETY DISORDER: ICD-10-CM

## 2025-01-18 DIAGNOSIS — G47.00 INSOMNIA, UNSPECIFIED: ICD-10-CM

## 2025-01-18 DIAGNOSIS — F34.0 CYCLOTHYMIC DISORDER: ICD-10-CM

## 2025-01-18 DIAGNOSIS — F90.0 ATTENTION-DEFICIT HYPERACTIVITY DISORDER, PREDOMINANTLY INATTENTIVE TYPE: ICD-10-CM

## 2025-03-07 ENCOUNTER — NON-APPOINTMENT (OUTPATIENT)
Age: 25
End: 2025-03-07

## 2025-03-10 ENCOUNTER — NON-APPOINTMENT (OUTPATIENT)
Age: 25
End: 2025-03-10

## 2025-03-14 ENCOUNTER — OUTPATIENT (OUTPATIENT)
Dept: OUTPATIENT SERVICES | Facility: HOSPITAL | Age: 25
LOS: 1 days | End: 2025-03-14
Payer: COMMERCIAL

## 2025-03-14 ENCOUNTER — APPOINTMENT (OUTPATIENT)
Dept: PSYCHIATRY | Facility: CLINIC | Age: 25
End: 2025-03-14
Payer: COMMERCIAL

## 2025-03-14 DIAGNOSIS — F41.1 GENERALIZED ANXIETY DISORDER: ICD-10-CM

## 2025-03-14 DIAGNOSIS — F90.0 ATTENTION-DEFICIT HYPERACTIVITY DISORDER, PREDOMINANTLY INATTENTIVE TYPE: ICD-10-CM

## 2025-03-14 DIAGNOSIS — G47.00 INSOMNIA, UNSPECIFIED: ICD-10-CM

## 2025-03-14 DIAGNOSIS — F34.0 CYCLOTHYMIC DISORDER: ICD-10-CM

## 2025-03-14 PROCEDURE — 99214 OFFICE O/P EST MOD 30 MIN: CPT | Mod: 95

## 2025-03-14 PROCEDURE — 98007 SYNCH AUDIO-VIDEO EST HI 40: CPT

## 2025-03-15 DIAGNOSIS — F90.0 ATTENTION-DEFICIT HYPERACTIVITY DISORDER, PREDOMINANTLY INATTENTIVE TYPE: ICD-10-CM

## 2025-03-15 DIAGNOSIS — F34.0 CYCLOTHYMIC DISORDER: ICD-10-CM

## 2025-03-15 DIAGNOSIS — F41.1 GENERALIZED ANXIETY DISORDER: ICD-10-CM

## 2025-03-15 DIAGNOSIS — G47.00 INSOMNIA, UNSPECIFIED: ICD-10-CM

## 2025-03-26 ENCOUNTER — NON-APPOINTMENT (OUTPATIENT)
Age: 25
End: 2025-03-26

## 2025-03-27 ENCOUNTER — NON-APPOINTMENT (OUTPATIENT)
Age: 25
End: 2025-03-27

## 2025-03-28 ENCOUNTER — APPOINTMENT (OUTPATIENT)
Dept: PSYCHIATRY | Facility: CLINIC | Age: 25
End: 2025-03-28
Payer: COMMERCIAL

## 2025-03-28 ENCOUNTER — OUTPATIENT (OUTPATIENT)
Dept: OUTPATIENT SERVICES | Facility: HOSPITAL | Age: 25
LOS: 1 days | End: 2025-03-28
Payer: COMMERCIAL

## 2025-03-28 DIAGNOSIS — F90.0 ATTENTION-DEFICIT HYPERACTIVITY DISORDER, PREDOMINANTLY INATTENTIVE TYPE: ICD-10-CM

## 2025-03-28 DIAGNOSIS — F41.1 GENERALIZED ANXIETY DISORDER: ICD-10-CM

## 2025-03-28 DIAGNOSIS — G47.00 INSOMNIA, UNSPECIFIED: ICD-10-CM

## 2025-03-28 DIAGNOSIS — F34.0 CYCLOTHYMIC DISORDER: ICD-10-CM

## 2025-03-28 PROCEDURE — 98007 SYNCH AUDIO-VIDEO EST HI 40: CPT

## 2025-03-28 PROCEDURE — 99214 OFFICE O/P EST MOD 30 MIN: CPT | Mod: 95

## 2025-03-29 DIAGNOSIS — G47.00 INSOMNIA, UNSPECIFIED: ICD-10-CM

## 2025-03-29 DIAGNOSIS — F34.0 CYCLOTHYMIC DISORDER: ICD-10-CM

## 2025-03-29 DIAGNOSIS — F90.0 ATTENTION-DEFICIT HYPERACTIVITY DISORDER, PREDOMINANTLY INATTENTIVE TYPE: ICD-10-CM

## 2025-03-29 DIAGNOSIS — F41.1 GENERALIZED ANXIETY DISORDER: ICD-10-CM

## 2025-04-11 ENCOUNTER — APPOINTMENT (OUTPATIENT)
Dept: PSYCHIATRY | Facility: CLINIC | Age: 25
End: 2025-04-11
Payer: COMMERCIAL

## 2025-04-11 ENCOUNTER — OUTPATIENT (OUTPATIENT)
Dept: OUTPATIENT SERVICES | Facility: HOSPITAL | Age: 25
LOS: 1 days | End: 2025-04-11
Payer: COMMERCIAL

## 2025-04-11 DIAGNOSIS — G47.00 INSOMNIA, UNSPECIFIED: ICD-10-CM

## 2025-04-11 DIAGNOSIS — F41.1 GENERALIZED ANXIETY DISORDER: ICD-10-CM

## 2025-04-11 DIAGNOSIS — F34.0 CYCLOTHYMIC DISORDER: ICD-10-CM

## 2025-04-11 DIAGNOSIS — F90.0 ATTENTION-DEFICIT HYPERACTIVITY DISORDER, PREDOMINANTLY INATTENTIVE TYPE: ICD-10-CM

## 2025-04-11 PROCEDURE — 99214 OFFICE O/P EST MOD 30 MIN: CPT | Mod: 95

## 2025-04-11 PROCEDURE — 98007 SYNCH AUDIO-VIDEO EST HI 40: CPT

## 2025-04-11 RX ORDER — OLANZAPINE 10 MG/1
10 TABLET, FILM COATED ORAL
Qty: 30 | Refills: 1 | Status: ACTIVE | COMMUNITY
Start: 2025-04-11 | End: 1900-01-01

## 2025-04-11 RX ORDER — OLANZAPINE 2.5 MG/1
2.5 TABLET, FILM COATED ORAL
Qty: 30 | Refills: 1 | Status: ACTIVE | COMMUNITY
Start: 2025-04-11 | End: 1900-01-01

## 2025-04-12 DIAGNOSIS — F90.0 ATTENTION-DEFICIT HYPERACTIVITY DISORDER, PREDOMINANTLY INATTENTIVE TYPE: ICD-10-CM

## 2025-04-12 DIAGNOSIS — G47.00 INSOMNIA, UNSPECIFIED: ICD-10-CM

## 2025-04-12 DIAGNOSIS — F34.0 CYCLOTHYMIC DISORDER: ICD-10-CM

## 2025-04-12 DIAGNOSIS — F41.1 GENERALIZED ANXIETY DISORDER: ICD-10-CM

## 2025-05-09 ENCOUNTER — APPOINTMENT (OUTPATIENT)
Dept: PSYCHIATRY | Facility: CLINIC | Age: 25
End: 2025-05-09

## 2025-05-09 ENCOUNTER — NON-APPOINTMENT (OUTPATIENT)
Age: 25
End: 2025-05-09

## 2025-05-10 NOTE — HISTORY OF PRESENT ILLNESS
No [FreeTextEntry1] : This is a 22 year old patient who presents for medication management.  The patient reportsimproved mood, sleep and poor appetite.  The patient denies depression, horace, denies psychosis at this time.  The patient has some ongoing  anxiety that impairs their daily functioning. The patient denies any suicidal ideation, homicidal ideation, no auditory or visual hallucinations, or paranoid ideation.  The patient has no medical complaints. The patient denies any alcohol use, and is not smoking at this time.  He will try to reduce and stop cannabis use. I received the patient's updated physical and labs from their PCP.  Coping skills were discussed and a safety plan was provided.  The patient was educated on the risks, benefits, and alternatives of their psychiatric medications.  The patient will engage in psychotherapy at this time.  The patient consents to ongoing medication management.\par \par Stable, continue medications. [No] : no [TextBox_32] : +) recent passive SI “I had thoughts of wanting to hurt myself, I wish I didn’t wake up,” “it’s when I feel like my life isn’t going anywhere,” none current, no intent or plan, no hx of SA. [de-identified] : \par  [None] : none [Responsibility to family or others] : responsibility to family or others [Identifies reasons for living] : identifies reasons for living [Future oriented] : future oriented [Engaged in work or school] : engaged in work or school [TextBox_52] : +) recent passive SI “I had thoughts of wanting to hurt myself, I wish I didn’t wake up,” “it’s when I feel like my life isn’t going anywhere,” none current, no intent or plan, no hx of SA. [None Known] : none known [Yes] : yes [Irritability] : irritability [Residential stability] : residential stability [Relationship stability] : relationship stability [Employment stability] : employment stability [Affective stability] : affective stability